# Patient Record
Sex: MALE | Race: WHITE | Employment: PART TIME | URBAN - METROPOLITAN AREA
[De-identification: names, ages, dates, MRNs, and addresses within clinical notes are randomized per-mention and may not be internally consistent; named-entity substitution may affect disease eponyms.]

---

## 2017-05-12 ENCOUNTER — HOSPITAL ENCOUNTER (OUTPATIENT)
Age: 37
Discharge: HOME | End: 2017-05-12
Payer: COMMERCIAL

## 2017-05-12 LAB
ALBUMIN SERPL-MCNC: 4.6 G/DL (ref 3.5–5.2)
ALBUMIN/GLOBULIN RATIO: 1.6 (ref 1–2.5)
ALP BLD-CCNC: 52 U/L (ref 40–129)
ALT SERPL-CCNC: 32 U/L (ref 5–41)
ANION GAP SERPL CALCULATED.3IONS-SCNC: 14 MMOL/L (ref 9–17)
AST SERPL-CCNC: 16 U/L
BILIRUB SERPL-MCNC: 0.65 MG/DL (ref 0.3–1.2)
BUN BLDV-MCNC: 16 MG/DL (ref 6–20)
BUN/CREAT BLD: ABNORMAL (ref 9–20)
CALCIUM SERPL-MCNC: 9.3 MG/DL (ref 8.6–10.4)
CHLORIDE BLD-SCNC: 98 MMOL/L (ref 98–107)
CHOLESTEROL, FASTING: 231 MG/DL
CHOLESTEROL/HDL RATIO: 5
CO2: 25 MMOL/L (ref 20–31)
CREAT SERPL-MCNC: 1.07 MG/DL (ref 0.7–1.2)
GFR AFRICAN AMERICAN: >60 ML/MIN
GFR NON-AFRICAN AMERICAN: >60 ML/MIN
GFR SERPL CREATININE-BSD FRML MDRD: ABNORMAL ML/MIN/{1.73_M2}
GFR SERPL CREATININE-BSD FRML MDRD: ABNORMAL ML/MIN/{1.73_M2}
GLUCOSE FASTING: 100 MG/DL (ref 70–99)
HCT VFR BLD CALC: 47.1 % (ref 41–53)
HDLC SERPL-MCNC: 46 MG/DL
HEMOGLOBIN: 16.1 G/DL (ref 13.5–17.5)
LDL CHOLESTEROL: 159 MG/DL (ref 0–130)
MCH RBC QN AUTO: 29.6 PG (ref 26–34)
MCHC RBC AUTO-ENTMCNC: 34.3 G/DL (ref 31–37)
MCV RBC AUTO: 86.5 FL (ref 80–100)
PDW BLD-RTO: 12.9 % (ref 12.5–15.4)
PLATELET # BLD: 231 K/UL (ref 140–450)
PMV BLD AUTO: 7.4 FL (ref 6–12)
POTASSIUM SERPL-SCNC: 3.8 MMOL/L (ref 3.7–5.3)
RBC # BLD: 5.45 M/UL (ref 4.5–5.9)
SODIUM BLD-SCNC: 137 MMOL/L (ref 135–144)
THYROXINE, FREE: 1.41 NG/DL (ref 0.93–1.7)
TOTAL PROTEIN: 7.5 G/DL (ref 6.4–8.3)
TRIGLYCERIDE, FASTING: 132 MG/DL
TSH SERPL DL<=0.05 MIU/L-ACNC: 3.06 MIU/L (ref 0.3–5)
VLDLC SERPL CALC-MCNC: ABNORMAL MG/DL (ref 1–30)
WBC # BLD: 5.8 K/UL (ref 3.5–11)

## 2017-05-12 PROCEDURE — 83036 HEMOGLOBIN GLYCOSYLATED A1C: CPT

## 2017-05-12 PROCEDURE — 36415 COLL VENOUS BLD VENIPUNCTURE: CPT

## 2017-05-12 PROCEDURE — 85027 COMPLETE CBC AUTOMATED: CPT

## 2017-05-12 PROCEDURE — 84439 ASSAY OF FREE THYROXINE: CPT

## 2017-05-12 PROCEDURE — 84443 ASSAY THYROID STIM HORMONE: CPT

## 2017-05-12 PROCEDURE — 80053 COMPREHEN METABOLIC PANEL: CPT

## 2017-05-12 PROCEDURE — 80061 LIPID PANEL: CPT

## 2017-05-13 LAB
ESTIMATED AVERAGE GLUCOSE: 97 MG/DL
HBA1C MFR BLD: 5 % (ref 4–6)

## 2017-12-29 ENCOUNTER — HOSPITAL ENCOUNTER (INPATIENT)
Age: 37
LOS: 2 days | Discharge: HOME | DRG: 885 | End: 2017-12-31
Attending: EMERGENCY MEDICINE | Admitting: PSYCHIATRY & NEUROLOGY
Payer: COMMERCIAL

## 2017-12-29 DIAGNOSIS — F32.A DEPRESSION WITH SUICIDAL IDEATION: Primary | ICD-10-CM

## 2017-12-29 DIAGNOSIS — R45.851 DEPRESSION WITH SUICIDAL IDEATION: Primary | ICD-10-CM

## 2017-12-29 PROBLEM — F31.9 BIPOLAR 1 DISORDER (HCC): Status: ACTIVE | Noted: 2017-12-29

## 2017-12-29 LAB
AMPHETAMINE SCREEN URINE: NEGATIVE
BARBITURATE SCREEN URINE: NEGATIVE
BENZODIAZEPINE SCREEN, URINE: NEGATIVE
BUPRENORPHINE URINE: NORMAL
CANNABINOID SCREEN URINE: NEGATIVE
COCAINE METABOLITE, URINE: NEGATIVE
MDMA URINE: NORMAL
METHADONE SCREEN, URINE: NEGATIVE
METHAMPHETAMINE, URINE: NORMAL
OPIATES, URINE: NEGATIVE
OXYCODONE SCREEN URINE: NEGATIVE
PHENCYCLIDINE, URINE: NEGATIVE
PROPOXYPHENE, URINE: NORMAL
TEST INFORMATION: NORMAL
TRICYCLIC ANTIDEPRESSANTS, UR: NORMAL

## 2017-12-29 PROCEDURE — 80307 DRUG TEST PRSMV CHEM ANLYZR: CPT

## 2017-12-29 PROCEDURE — 6370000000 HC RX 637 (ALT 250 FOR IP): Performed by: PSYCHIATRY & NEUROLOGY

## 2017-12-29 PROCEDURE — 1240000000 HC EMOTIONAL WELLNESS R&B

## 2017-12-29 PROCEDURE — 99285 EMERGENCY DEPT VISIT HI MDM: CPT

## 2017-12-29 RX ORDER — BUPROPION HYDROCHLORIDE 300 MG/1
300 TABLET ORAL EVERY MORNING
COMMUNITY

## 2017-12-29 RX ORDER — MONTELUKAST SODIUM 10 MG/1
10 TABLET ORAL NIGHTLY
COMMUNITY

## 2017-12-29 RX ORDER — LORAZEPAM 1 MG/1
1 TABLET ORAL EVERY 6 HOURS PRN
Status: DISCONTINUED | OUTPATIENT
Start: 2017-12-29 | End: 2017-12-31 | Stop reason: HOSPADM

## 2017-12-29 RX ORDER — HALOPERIDOL 1 MG/1
1 TABLET ORAL 2 TIMES DAILY
Status: DISCONTINUED | OUTPATIENT
Start: 2017-12-29 | End: 2017-12-31 | Stop reason: HOSPADM

## 2017-12-29 RX ORDER — CLONAZEPAM 1 MG/1
1 TABLET ORAL 2 TIMES DAILY PRN
COMMUNITY
End: 2018-10-08

## 2017-12-29 RX ORDER — HALOPERIDOL 5 MG/ML
5 INJECTION INTRAMUSCULAR 3 TIMES DAILY PRN
Status: DISCONTINUED | OUTPATIENT
Start: 2017-12-29 | End: 2017-12-31 | Stop reason: HOSPADM

## 2017-12-29 RX ORDER — MAGNESIUM HYDROXIDE/ALUMINUM HYDROXICE/SIMETHICONE 120; 1200; 1200 MG/30ML; MG/30ML; MG/30ML
30 SUSPENSION ORAL 2 TIMES DAILY PRN
Status: DISCONTINUED | OUTPATIENT
Start: 2017-12-29 | End: 2017-12-31 | Stop reason: HOSPADM

## 2017-12-29 RX ORDER — BUPROPION HYDROCHLORIDE 150 MG/1
300 TABLET, EXTENDED RELEASE ORAL DAILY
Status: DISCONTINUED | OUTPATIENT
Start: 2017-12-29 | End: 2017-12-31 | Stop reason: HOSPADM

## 2017-12-29 RX ORDER — CLONAZEPAM 1 MG/1
1 TABLET, ORALLY DISINTEGRATING ORAL 3 TIMES DAILY PRN
Status: DISCONTINUED | OUTPATIENT
Start: 2017-12-29 | End: 2017-12-29

## 2017-12-29 RX ORDER — BENZTROPINE MESYLATE 1 MG/ML
2 INJECTION INTRAMUSCULAR; INTRAVENOUS DAILY PRN
Status: DISCONTINUED | OUTPATIENT
Start: 2017-12-29 | End: 2017-12-31 | Stop reason: HOSPADM

## 2017-12-29 RX ORDER — TRAZODONE HYDROCHLORIDE 50 MG/1
50 TABLET ORAL NIGHTLY PRN
Status: DISCONTINUED | OUTPATIENT
Start: 2017-12-29 | End: 2017-12-31 | Stop reason: HOSPADM

## 2017-12-29 RX ORDER — BUPROPION HYDROCHLORIDE 100 MG/1
100 TABLET ORAL 2 TIMES DAILY
Status: ON HOLD | COMMUNITY
End: 2017-12-29 | Stop reason: ALTCHOICE

## 2017-12-29 RX ORDER — HALOPERIDOL 1 MG/1
1 TABLET ORAL 2 TIMES DAILY
Status: DISCONTINUED | OUTPATIENT
Start: 2017-12-29 | End: 2017-12-29

## 2017-12-29 RX ORDER — HALOPERIDOL 1 MG/1
1 TABLET ORAL 2 TIMES DAILY
COMMUNITY

## 2017-12-29 RX ORDER — ACETAMINOPHEN 325 MG/1
650 TABLET ORAL EVERY 6 HOURS PRN
Status: DISCONTINUED | OUTPATIENT
Start: 2017-12-29 | End: 2017-12-31 | Stop reason: HOSPADM

## 2017-12-29 RX ORDER — MONTELUKAST SODIUM 10 MG/1
10 TABLET ORAL NIGHTLY
Status: DISCONTINUED | OUTPATIENT
Start: 2017-12-29 | End: 2017-12-31 | Stop reason: HOSPADM

## 2017-12-29 RX ORDER — CLONAZEPAM 1 MG/1
1 TABLET ORAL 2 TIMES DAILY
Status: DISCONTINUED | OUTPATIENT
Start: 2017-12-29 | End: 2017-12-31 | Stop reason: HOSPADM

## 2017-12-29 RX ADMIN — CLONAZEPAM 1 MG: 1 TABLET ORAL at 20:20

## 2017-12-29 RX ADMIN — HALOPERIDOL 1 MG: 1 TABLET ORAL at 19:17

## 2017-12-29 ASSESSMENT — ENCOUNTER SYMPTOMS
SINUS PRESSURE: 0
TROUBLE SWALLOWING: 0
RHINORRHEA: 0
BLOOD IN STOOL: 0
CONSTIPATION: 0
ABDOMINAL PAIN: 0
COLOR CHANGE: 0
SORE THROAT: 0
DIARRHEA: 0
COUGH: 0
WHEEZING: 0
SHORTNESS OF BREATH: 0
BACK PAIN: 0
NAUSEA: 0
EYE PAIN: 0
FACIAL SWELLING: 0
EYE REDNESS: 0
VOMITING: 0
CHEST TIGHTNESS: 0
EYE DISCHARGE: 0

## 2017-12-29 ASSESSMENT — SLEEP AND FATIGUE QUESTIONNAIRES
DO YOU HAVE DIFFICULTY SLEEPING: NO
DO YOU USE A SLEEP AID: NO
AVERAGE NUMBER OF SLEEP HOURS: 8
DO YOU USE A SLEEP AID: COMMENT
DO YOU HAVE DIFFICULTY SLEEPING: NO

## 2017-12-29 ASSESSMENT — PATIENT HEALTH QUESTIONNAIRE - PHQ9: SUM OF ALL RESPONSES TO PHQ QUESTIONS 1-9: 5

## 2017-12-29 ASSESSMENT — LIFESTYLE VARIABLES
HISTORY_ALCOHOL_USE: NO
HISTORY_ALCOHOL_USE: NO

## 2017-12-29 NOTE — ED PROVIDER NOTES
[address]  eMERGENCY dEPARTMENT eNCOUnter      Pt Name: Wyman Cunas. Lavada Curling  MRN: [id number]  Birthdate 1980  Date of evaluation: 12/29/17      CHIEF COMPLAINT       Chief Complaint   Patient presents with   [address]         HISTORY OF PRESENT ILLNESS    Jean Marie Faulkner A. Lavada Curling is a 30 y.o. male who presents complaining of Psychiatric evaluation. Patient comes in today complaining of feeling like he's got no direction in life and is depressed. Patient states he does have a long history of depression and has been under treatment by a private psychiatrist.  Patient did go see his psychiatrist earlier this week but the symptoms weren't as bad so he didn't bring him up to him. Patient states that his stress level has been getting worse and he constantly worries about money and that the last few days he's been considering killing himself. Patient states he has a plan to lock himself in the car and turned on in the garage and suffocate with carbon monoxide. Patient states he has no drug or alcohol intake. Patient is been taking his medications like he supposed to. Patient states is really no new stressors just chronic everyday stressors. Patient denies somatic complaints. Patient denies hallucinations. REVIEW OF SYSTEMS       Review of Systems   Constitutional: Negative for activity change, appetite change, chills, diaphoresis and fever. HENT: Negative for congestion, ear pain, facial swelling, nosebleeds, rhinorrhea, sinus pressure, sore throat and trouble swallowing. Eyes: Negative for pain, discharge and redness. Respiratory: Negative for cough, chest tightness, shortness of breath and wheezing. Cardiovascular: Negative for chest pain, palpitations and leg swelling. Gastrointestinal: Negative for abdominal pain, blood in stool, constipation, diarrhea, nausea and vomiting.    Genitourinary: Negative for difficulty urinating, dysuria, flank pain, frequency, genital sores and hematuria. Musculoskeletal: Negative for arthralgias, back pain, gait problem, joint swelling, myalgias and neck pain. Skin: Negative for color change, pallor, rash and wound. Neurological: Negative for dizziness, tremors, seizures, syncope, speech difficulty, weakness, numbness and headaches. Psychiatric/Behavioral: Positive for dysphoric mood and suicidal ideas. Negative for confusion, decreased concentration, hallucinations, self-injury and sleep disturbance. PAST MEDICAL HISTORY     Past Medical History:   Diagnosis Date    ADHD     Anxiety     Depression     Tourette's        SURGICAL HISTORY     History reviewed. No pertinent surgical history. CURRENT MEDICATIONS       Previous Medications    BUPROPION (WELLBUTRIN) 100 MG TABLET    Take 100 mg by mouth 2 times daily    CLONAZEPAM (KLONOPIN) 1 MG TABLET    Take 1 mg by mouth 2 times daily as needed. HALOPERIDOL (HALDOL) 1 MG TABLET    Take 1 mg by mouth 2 times daily    MONTELUKAST (SINGULAIR) 10 MG TABLET    Take 10 mg by mouth nightly       ALLERGIES     has No Known Allergies. SOCIAL HISTORY      reports that he has never smoked. He has never used smokeless tobacco. He reports that he does not drink alcohol or use drugs. PHYSICAL EXAM     INITIAL VITALS: /83   Pulse 62   Temp 98.3 °F (36.8 °C)   Resp 14   Ht 5' 10\" (1.778 m)   Wt 167 lb (75.8 kg)   SpO2 96%   BMI 23.96 kg/m²      Physical Exam   Constitutional: He is oriented to person, place, and time. He appears well-developed and well-nourished. No distress. HENT:   Head: Normocephalic and atraumatic. Eyes: Conjunctivae and EOM are normal. Pupils are equal, round, and reactive to light. Right eye exhibits no discharge. Left eye exhibits no discharge. No scleral icterus. Cardiovascular: Normal rate, regular rhythm, normal heart sounds and intact distal pulses. Exam reveals no gallop and no friction rub. No murmur heard.   Pulmonary/Chest: Effort normal and breath sounds normal. No respiratory distress. He has no wheezes. He has no rales. Neurological: He is alert and oriented to person, place, and time. Skin: He is not diaphoretic. Psychiatric: His speech is normal. He is withdrawn. He exhibits a depressed mood. He expresses suicidal ideation. He expresses no homicidal ideation. He expresses suicidal plans. DIAGNOSTIC RESULTS     RADIOLOGY:All plain film, CT, MRI, and formal ultrasound images (except ED bedside ultrasound) are read by the radiologist and the images and interpretations are directly viewed by the emergency physician. LABS: All lab results were reviewed by myself, and all abnormals are listed below. Labs Reviewed - No data to display      MEDICAL DECISION MAKING:     Patient is medically cleared for psychiatric evaluation. EMERGENCY DEPARTMENT COURSE:   Vitals:    Vitals:    12/29/17 1016   BP: 137/83   Pulse: 62   Resp: 14   Temp: 98.3 °F (36.8 °C)   SpO2: 96%   Weight: 167 lb (75.8 kg)   Height: 5' 10\" (1.778 m)       The patient was given the following medications while in the emergency department:  No orders of the defined types were placed in this encounter. -------------------------  11:49 AM  Patient has been evaluated and will be admitted to the [facility] for further psychiatric evaluation and treatment. CONSULTS:  None    PROCEDURES:  none    FINAL IMPRESSION      1.  Depression with suicidal ideation          DISPOSITION/PLAN   DISPOSITION Admitted 12/29/2017 11:48:12 AM      PATIENT REFERRED TO:  Ruba Moura MD  [address] [place] [address]             DISCHARGE MEDICATIONS:  New Prescriptions    No medications on file       (Please note that portions of this note were completed with a voice recognition program.  Efforts were made to edit the dictations but occasionally words are mis-transcribed.)    Valentine Chaudhry MD  Attending Emergency Physician                        [facility]

## 2017-12-29 NOTE — ED NOTES
Provisional Diagnosis:   Bi-polar disorder. Psychosocial and Contextual Factors:   Patient reports recent obsessive thoughts about his financial situation. Patient reports he hates his job    C-SSRS Summary:      Patient: X  Family:   Agency:       Present Suicidal Behavior:  Patient reports suicidal ideation with a plan and intent. Patient reports he texted his Taoism friend last night and asked if what would happen if he completed suicide. Verbal: X    Attempt:    Past Suicidal Behavior: Patient reports past suicidal ideation but denies past attempt. Verbal:    Attempt:      Self-Injurious/Self-Mutilation:Patient reports in the past he has cut to relieve pain. Trauma Identified:  Patient denies. Protective Factors:    Patient has a full time job, income, and housing. Patient reports support from his girlfriend. Patient reports his girlfriend and Taoism beliefs are protective factors. Patient states he exercises 3 days a week. Risk Factors:    Patient reports suicidal thoughts with a plan. Patient reports financial stress. Clinical Summary:    Rika Rose. Jarod Baeza is a 30 y.o. male who presents to the ED for suicidal ideation with a plan to kill himself by carbon monoxide poisoning in his car and garage. Patient reports he feels completely stuck in life  Patient states he has been comparing himself to his other college peers who are making a lot of money while he \"works for 12 hours an hour\". Patient works full time as a pharmacy tech. Patient states he absolutely hates his job and his coworkers. Patient states he has 2 college degrees and does not use them which makes him frustrated. Patient reports daily feelings of hopelessness and helplessness. Patient reports he had a particular rough day at work yesterday. Patient reports poor sleep.      Patient reports obsessive thoughts about his financial situation and states he often calculates his finances and not being able to afford things in the future like FDC and nursing home placement decades from now. Patient states he wrote a text last night to a [employer] friend about what would happen if he followed through with suicide. Patient states his friend told him to go to [employer] or a hospital.  Patient reports he is compliant with his outpatient medication. Patient is medication compliant with medications prescribed by Dr. Yuri Titus. Patient denies homicidal ideation. Patient denies auditory and visual hallucinations. Level of Care Disposition:    Writer consulted with Dr. Sandra Borrego who recommends inpatient psychiatric admission. Patient is in agreement.        Insurance Precertification Authorization:

## 2017-12-29 NOTE — BH NOTE
[facility] PSYCHOEDUCATION GROUP NOTE     Date:  12/29/17       Start Time:  1600    End Time: 36        Number Participants in Group:12     Name of group: Communication Skills     Discipline Responsible:    OT   AT    x Nsg.   RT MHP Other         Participation Level:                   None   Minimal   x Active Listener x Interactive     Monopolizing             Participation Quality:  x Appropriate   Inappropriate   x       Attentive         Intrusive   x       Sharing         Resistant   x       Supportive         Lethargic         Affective:          x Congruent   Incongruent   Blunted   Flat     Constricted   Anxious   Elated   Angry     Labile   Depressed   Other             Cognitive:  x Alert x Oriented PPTP      Concentration   G x F   P   Attention Span   G x F   P   Short-Term Memory   G x F   P   Long-Term Memory   G x F   P   ProblemSolving/  Decision Making   G x F   P   Ability to Process  Information   G x F   P        Contributing Factors              Delusional              Hallucinating              Flight of Ideas              Other: poor concentration         Modes of Intervention:  x Education x Support x Exploration     Clarifying   Problem Solving   Confrontation   x Socialization   Limit Setting   Reality Testing   x Activity   Movement   Media     Other:                  Response to Learning:  x Able to verbalize current knowledge/experience   x Able to verbalize/acknowledge new learning   x Able to retain information   x Capable of insight   x Able to change behavior   x Progressing to goal     Other:          Comments:

## 2017-12-30 LAB
ABSOLUTE EOS #: 0.3 K/UL (ref 0–0.4)
ABSOLUTE IMMATURE GRANULOCYTE: ABNORMAL K/UL (ref 0–0.3)
ABSOLUTE LYMPH #: 2.4 K/UL (ref 1–4.8)
ABSOLUTE MONO #: 0.6 K/UL (ref 0.1–1.3)
ALBUMIN SERPL-MCNC: 4.5 G/DL (ref 3.5–5.2)
ALBUMIN/GLOBULIN RATIO: ABNORMAL (ref 1–2.5)
ALP BLD-CCNC: 51 U/L (ref 40–129)
ALT SERPL-CCNC: 28 U/L (ref 5–41)
ANION GAP SERPL CALCULATED.3IONS-SCNC: 13 MMOL/L (ref 9–17)
AST SERPL-CCNC: 17 U/L
BASOPHILS # BLD: 1 % (ref 0–2)
BASOPHILS ABSOLUTE: 0 K/UL (ref 0–0.2)
BILIRUB SERPL-MCNC: 0.77 MG/DL (ref 0.3–1.2)
BUN BLDV-MCNC: 18 MG/DL (ref 6–20)
BUN/CREAT BLD: ABNORMAL (ref 9–20)
CALCIUM SERPL-MCNC: 9.5 MG/DL (ref 8.6–10.4)
CHLORIDE BLD-SCNC: 100 MMOL/L (ref 98–107)
CO2: 27 MMOL/L (ref 20–31)
CREAT SERPL-MCNC: 1.07 MG/DL (ref 0.7–1.2)
DIFFERENTIAL TYPE: ABNORMAL
EOSINOPHILS RELATIVE PERCENT: 5 % (ref 0–4)
GFR AFRICAN AMERICAN: >60 ML/MIN
GFR NON-AFRICAN AMERICAN: >60 ML/MIN
GFR SERPL CREATININE-BSD FRML MDRD: ABNORMAL ML/MIN/{1.73_M2}
GFR SERPL CREATININE-BSD FRML MDRD: ABNORMAL ML/MIN/{1.73_M2}
GLUCOSE BLD-MCNC: 101 MG/DL (ref 70–99)
HCT VFR BLD CALC: 49.9 % (ref 41–53)
HEMOGLOBIN: 16.9 G/DL (ref 13.5–17.5)
IMMATURE GRANULOCYTES: ABNORMAL %
LYMPHOCYTES # BLD: 36 % (ref 24–44)
MCH RBC QN AUTO: 30 PG (ref 26–34)
MCHC RBC AUTO-ENTMCNC: 33.8 G/DL (ref 31–37)
MCV RBC AUTO: 88.7 FL (ref 80–100)
MONOCYTES # BLD: 9 % (ref 1–7)
PDW BLD-RTO: 12.8 % (ref 11.5–14.9)
PLATELET # BLD: 231 K/UL (ref 150–450)
PLATELET ESTIMATE: ABNORMAL
PMV BLD AUTO: 7.2 FL (ref 6–12)
POTASSIUM SERPL-SCNC: 4.1 MMOL/L (ref 3.7–5.3)
RBC # BLD: 5.62 M/UL (ref 4.5–5.9)
RBC # BLD: ABNORMAL 10*6/UL
SEG NEUTROPHILS: 49 % (ref 36–66)
SEGMENTED NEUTROPHILS ABSOLUTE COUNT: 3.2 K/UL (ref 1.3–9.1)
SODIUM BLD-SCNC: 140 MMOL/L (ref 135–144)
TOTAL PROTEIN: 7.1 G/DL (ref 6.4–8.3)
WBC # BLD: 6.5 K/UL (ref 3.5–11)
WBC # BLD: ABNORMAL 10*3/UL

## 2017-12-30 PROCEDURE — 6370000000 HC RX 637 (ALT 250 FOR IP): Performed by: PSYCHIATRY & NEUROLOGY

## 2017-12-30 PROCEDURE — 36415 COLL VENOUS BLD VENIPUNCTURE: CPT

## 2017-12-30 PROCEDURE — 1240000000 HC EMOTIONAL WELLNESS R&B

## 2017-12-30 PROCEDURE — 80053 COMPREHEN METABOLIC PANEL: CPT

## 2017-12-30 PROCEDURE — 85025 COMPLETE CBC W/AUTO DIFF WBC: CPT

## 2017-12-30 RX ADMIN — CLONAZEPAM 1 MG: 1 TABLET ORAL at 17:38

## 2017-12-30 RX ADMIN — HALOPERIDOL 1 MG: 1 TABLET ORAL at 09:41

## 2017-12-30 RX ADMIN — TRAZODONE HYDROCHLORIDE 50 MG: 50 TABLET ORAL at 21:27

## 2017-12-30 RX ADMIN — HALOPERIDOL 1 MG: 1 TABLET ORAL at 17:38

## 2017-12-30 RX ADMIN — MONTELUKAST SODIUM 10 MG: 10 TABLET, FILM COATED ORAL at 21:27

## 2017-12-30 RX ADMIN — CLONAZEPAM 1 MG: 1 TABLET ORAL at 09:40

## 2017-12-30 RX ADMIN — BUPROPION HYDROCHLORIDE 300 MG: 150 TABLET, FILM COATED, EXTENDED RELEASE ORAL at 09:41

## 2017-12-31 VITALS
TEMPERATURE: 97.9 F | BODY MASS INDEX: 23.02 KG/M2 | WEIGHT: 160.8 LBS | RESPIRATION RATE: 14 BRPM | OXYGEN SATURATION: 96 % | DIASTOLIC BLOOD PRESSURE: 72 MMHG | HEART RATE: 63 BPM | HEIGHT: 70 IN | SYSTOLIC BLOOD PRESSURE: 118 MMHG

## 2017-12-31 PROCEDURE — 6370000000 HC RX 637 (ALT 250 FOR IP): Performed by: PSYCHIATRY & NEUROLOGY

## 2017-12-31 PROCEDURE — 5130000000 HC BRIDGE APPOINTMENT

## 2017-12-31 RX ADMIN — HALOPERIDOL 1 MG: 1 TABLET ORAL at 08:47

## 2017-12-31 RX ADMIN — BUPROPION HYDROCHLORIDE 300 MG: 150 TABLET, FILM COATED, EXTENDED RELEASE ORAL at 08:47

## 2017-12-31 RX ADMIN — CLONAZEPAM 1 MG: 1 TABLET ORAL at 08:47

## 2017-12-31 ASSESSMENT — LIFESTYLE VARIABLES: HISTORY_ALCOHOL_USE: NO

## 2017-12-31 NOTE — BH NOTE
Psychoeducation Group Note    Date: 12/31/17  Start Time: 845AM  End Time: 920AM    Number Participants in Group:  12    Goal:  Patient will demonstrate increased interpersonal interaction   Topic: COMMUNITY MEETING/GOALS GROUP    Discipline Responsible:   OT  AT  [facility]. X RT  Other       Participation Level:     None  Minimal   X Active Listener X Interactive    Monopolizing         Participation Quality:   Appropriate  Inappropriate   X       Attentive        Intrusive   X       Sharing        Resistant   X       Supportive        Lethargic       Affective:   X Congruent  Incongruent  Blunted  Flat    Constricted  Anxious  Elated  Angry    Labile  Depressed  Other X BRIGHTENS       Cognitive:  X Alert X Oriented PPTP     Concentration X G  F  P   Attention Span X G  F  P   Short-Term Memory  G  F  P   Long-Term Memory  G  F  P   ProblemSolving/  Decision Making  G X F  P   Ability to Process  Information X G  F  P      Contributing Factors             Delusional             Hallucinating             Flight of Ideas             Other:       Modes of Intervention:  X Education X Support X Exploration   X Clarifying X Problem Solving  Confrontation    Socialization  Limit Setting  Reality Testing   X Activity  Movement  Media    Other:            Response to Learning:  X Able to verbalize current knowledge/experience   X Able to verbalize/acknowledge new learning   X Able to retain information    Capable of insight    Able to change behavior   X Progressing to goal    Other:        Comments:

## 2017-12-31 NOTE — CARE COORDINATION
Writer meets with PT regarding discharge planning and to check on suicidal ideation as stated during bio-psychosocial assessment at admission. PT is future focused and is planning to go home and spend time with parents and girlfriend. PT is looking forward to working out and preparing for compatition in May. PT states has set short and long term goals and making steps while here at the hospital.  PT denies current thoughts or plans of suicide, no feelings of hopelessness, and plans to attend follow-up appointment and stay on medications.

## 2017-12-31 NOTE — H&P
WNL.    EARS:  No discharge, no marked hearing loss. NOSE:  No rhinorrhea, epistaxis or septal deformity. THROAT:  Not congested. No ulceration bleeding or discharge. NECK:  No stiffness, trachea central.  No palpable masses or L.N.      CHEST:  Symmetrical and equal on expansion. HEART:  Regular rate and rhythm. S1 > S2, No audible murmurs or gallops. LUNGS:  Equal on expansion, normal breath sounds. No adventitious sounds. ABDOMEN:  Soft on palpation. No localized tenderness. No guarding or rigidity. No palpable organomegaly. LYMPHATICS:  No palpable Lymphadenopathy. LOCOMOTOR, BACK AND SPINE:  No tenderness or deformities. EXTREMITIES:  Symmetrical, no pedal edema. Aryans sign negative. No discoloration or ulcerations. NEUROLOGIC:  The patient is conscious, alert, oriented, Gait and balance WNL. No apparent focal sensory deficits. No motor deficits, muscle strength equal Anthony. No facial droop, tongue protrudes centrally, no slurring of the speech.             PROVISIONAL DIAGNOSES:      Principal Problem:    B[facility] 1 [facility] ([facility]      ALONZO COLLADO PA-C on 12/31/2017 at 2:06 PM

## 2017-12-31 NOTE — BH NOTE
PSYCHOEDUCATION GROUP NOTE    Date: 12/30/2017  Start Time: 1600  End Time: 1645    Number Participants in Group:  12    Goal:  Patient will demonstrate increased interpersonal interaction   Topic: goals    Discipline Responsible:   OT  AT   X Nsg.  RT MHP Other       Participation Level:     None  Minimal    Active Listener X Interactive    Monopolizing         Participation Quality:  X Appropriate  Inappropriate          Attentive        Intrusive          Sharing        Resistant          Supportive        Lethargic       Affective:   X Congruent  Incongruent  Blunted  Flat    Constricted  Anxious  Elated  Angry    Labile  Depressed  Other         Cognitive:  X Alert  Oriented PPTP     Concentration X G  F  P   Attention Span  G  F  P   Short-Term Memory  G  F  P   Long-Term Memory  G  F  P   ProblemSolving/  Decision Making  G  F  P   Ability to Process  Information  G  F  P      Contributing Factors             Delusional             Hallucinating             Flight of Ideas             Other:       Modes of Intervention:  X Education X Support  Exploration    Clarifying  Problem Solving  Confrontation    Socialization  Limit Setting  Reality Testing    Activity  Movement  Media    Other:            Response to Learning:   Able to verbalize current knowledge/experience    Able to verbalize/acknowledge new learning    Able to retain information    Capable of insight    Able to change behavior   X Progressing to goal    Other:        Comments:

## 2017-12-31 NOTE — PLAN OF CARE
Problem: Altered Mood, Depressive Behavior  Goal: LTG-Able to verbalize acceptance of life and situations over which he or she has no control  Outcome: Ongoing  Patient voiced being anxious. Was out in dayroom. Took all meds. Safety checks maintained Q15 minutes and regular times. No Si. Goal: STG-Able to verbalize suicidal ideations  Outcome: Ongoing  Pt denies thoughts of self harm and is agreeable to seeking out should thoughts of self harm arise. Safe environment maintained. Q15 minute checks for safety cont per unit policy. Will cont to monitor for safety and provides support and reassurance as needed.
Problem: Altered Mood, Depressive Behavior  Goal: LTG-Able to verbalize and/or display a decrease in depressive symptoms  Outcome: Ongoing  Pt able to verbalize a decrease in depressive symptoms at this time. Continue to monitor.
Problem: Altered Mood, Depressive Behavior  Goal: LTG-Absence of self-harm  Outcome: Ongoing  Patient is cooperative and pleasant and receptive to one on one interaction. He attends groups. He denies any thought to harm himself or others.  He denies any auditory or visual hallucinations
Problem: Altered Mood, Depressive Behavior  Goal: STG-Knowledge of positive coping patterns  Outcome: Ongoing  PSYCHOEDUCATIONAL GROUP NOTE       Date:  12/29/17                Start Time:  1:30pm                       End Time: 1:45pm      Number Participants in Group: 10/20      Goals: To participate in psychoeducational group and discuss discharge planning. RT X SW  Nsg  LPN   BHTII  Other       Participation Level:     None  Minimal   x Active Listener x Interactive    Monopolizing         Participation Quality:  x Appropriate  Inappropriate   x  Attentive   Intrusive   x  Sharing   Resistant   x  Supportive    Lethargic       Affective:   x Congruent  Incongruent  Blunted  Flat    Constricted  Anxious  Elated  Angry    Labile  Depressed  Other         Cognitive:  X Alert  Oriented PPTS     Concentration G x F  P    Attention Span G x F  P    Short-Term Memory G x F  P    Long-Term Memory G x F  P    ProblemSolving/  Decision Making G x F  P    Ability to Process  Information G x F  P       Contributing Factors             Delusional             Hallucinating             Flight of Ideas             Other: poor concentration       Modes of Intervention:  X Education X Support X Exploration   X Clarifying X Problem Solving  Confrontation    Socialization  Limit Setting  Reality Testing    Activity  Movement  Media    Other:          Response to Learning:  x Able to verbalize current knowledge/experience   x Able to verbalize/acknowledge new learning   x Able to retain information   x Capable of insight   x Able to change behavior   x Progressing to goal    Other:        Comments: Pt attended psychoeducation group and was interactive.
Problem: Altered Mood, Depressive Behavior  Goal: STG-Knowledge of positive coping patterns  Outcome: Ongoing  Psychoeducation Group Note    Date: 12/30/2017  Start Time: 0845  End Time: 0910    Number Participants in Group:  10    Goal:  Patient will demonstrate increased interpersonal interaction   Topic: Community meeting/ goals group    Discipline Responsible:   OT  AT  [facility]. x RT  Other       Participation Level:     None  Minimal   x Active Listener x Interactive    Monopolizing         Participation Quality:  x Appropriate  Inappropriate   x       Attentive        Intrusive   x       Sharing        Resistant          Supportive        Lethargic       Affective:   x Congruent  Incongruent  Blunted  Flat    Constricted  Anxious  Elated  Angry    Labile  Depressed  Other         Cognitive:  x Alert x Oriented PPTP     Concentration x G  F  P   Attention Span x G  F  P   Short-Term Memory x G  F  P   Long-Term Memory x G  F  P   ProblemSolving/  Decision Making x G  F  P   Ability to Process  Information x G  F  P      Contributing Factors             Delusional             Hallucinating             Flight of Ideas             Other:       Modes of Intervention:  x Education x Support x Exploration   x Clarifying x Problem Solving  Confrontation   x Socialization  Limit Setting x Reality Testing   x Activity  Movement  Media    Other:            Response to Learning:  x Able to verbalize current knowledge/experience   x Able to verbalize/acknowledge new learning   x Able to retain information    Capable of insight    Able to change behavior   x Progressing to goal    Other:        Comments:
Progress Toward Treatment Goals: reviewed group plans and strategies for care    Method:group therapy, medication compliance, individualized assessments and care planning    Outcome: needs reinforcement    PATIENT GOALS: to be discussed with patient within 72 hours    PLAN/TREATMENT RECOMMENDATIONS:     continue group therapy , medications compliance, goal setting, individualized assessments and care, continue to monitor pt on unit      SHORT-TERM GOALS:   Time frame for Short-Term Goals: 5-7 days    LONG-TERM GOALS:  Time frame for Long-Term Goals: 6 months  Members Present in Team Meeting: See Signature Sheet    Jimmy Knock  Goal: LTG-Able to verbalize and/or display a decrease in depressive symptoms  Outcome: Ongoing    Goal: STG-Able to verbalize suicidal ideations  Outcome: Ongoing    Goal: STG-Able to verbalize support system  Outcome: Ongoing    Goal: LTG-Absence of self-harm  Outcome: Ongoing    Goal: STG-Knowledge of positive coping patterns  Outcome: Ongoing    Goal: Patient Specific Goal  Outcome: Ongoing    Goal: Participation in care planning  Outcome: Ongoing

## 2018-01-03 NOTE — CARE COORDINATION
Alen called Dr. Saul Trammell office for PT to schedule his follow up appointment and PT is scheduled for January 5 at 10:30 am. ALEN telephoned client and left a voicemail with this information.

## 2018-04-13 ENCOUNTER — HOSPITAL ENCOUNTER (OUTPATIENT)
Age: 38
Discharge: HOME | End: 2018-04-13
Payer: COMMERCIAL

## 2018-04-13 LAB
ABSOLUTE EOS #: 0.19 K/UL (ref 0–0.44)
ABSOLUTE IMMATURE GRANULOCYTE: <0.03 K/UL (ref 0–0.3)
ABSOLUTE LYMPH #: 2.84 K/UL (ref 1.1–3.7)
ABSOLUTE MONO #: 0.64 K/UL (ref 0.1–1.2)
ALBUMIN SERPL-MCNC: 4.5 G/DL (ref 3.5–5.2)
ALBUMIN/GLOBULIN RATIO: 1.7 (ref 1–2.5)
ALP BLD-CCNC: 57 U/L (ref 40–129)
ALT SERPL-CCNC: 35 U/L (ref 5–41)
ANION GAP SERPL CALCULATED.3IONS-SCNC: 10 MMOL/L (ref 9–17)
AST SERPL-CCNC: 21 U/L
BASOPHILS # BLD: 0 % (ref 0–2)
BASOPHILS ABSOLUTE: 0.03 K/UL (ref 0–0.2)
BILIRUB SERPL-MCNC: 0.56 MG/DL (ref 0.3–1.2)
BUN BLDV-MCNC: 21 MG/DL (ref 6–20)
BUN/CREAT BLD: ABNORMAL (ref 9–20)
CALCIUM SERPL-MCNC: 8.8 MG/DL (ref 8.6–10.4)
CHLORIDE BLD-SCNC: 101 MMOL/L (ref 98–107)
CHOLESTEROL, FASTING: 186 MG/DL
CHOLESTEROL/HDL RATIO: 3.7
CO2: 28 MMOL/L (ref 20–31)
CREAT SERPL-MCNC: 1.06 MG/DL (ref 0.7–1.2)
DIFFERENTIAL TYPE: NORMAL
EOSINOPHILS RELATIVE PERCENT: 3 % (ref 1–4)
GFR AFRICAN AMERICAN: >60 ML/MIN
GFR NON-AFRICAN AMERICAN: >60 ML/MIN
GFR SERPL CREATININE-BSD FRML MDRD: ABNORMAL ML/MIN/{1.73_M2}
GFR SERPL CREATININE-BSD FRML MDRD: ABNORMAL ML/MIN/{1.73_M2}
GLUCOSE BLD-MCNC: 83 MG/DL (ref 70–99)
HCT VFR BLD CALC: 47.7 % (ref 40.7–50.3)
HDLC SERPL-MCNC: 50 MG/DL
HEMOGLOBIN: 15.6 G/DL (ref 13–17)
IMMATURE GRANULOCYTES: 0 %
LDL CHOLESTEROL: 120 MG/DL (ref 0–130)
LYMPHOCYTES # BLD: 40 % (ref 24–43)
MCH RBC QN AUTO: 29.6 PG (ref 25.2–33.5)
MCHC RBC AUTO-ENTMCNC: 32.7 G/DL (ref 28.4–34.8)
MCV RBC AUTO: 90.5 FL (ref 82.6–102.9)
MONOCYTES # BLD: 9 % (ref 3–12)
NRBC AUTOMATED: 0 PER 100 WBC
PDW BLD-RTO: 12.1 % (ref 11.8–14.4)
PLATELET # BLD: 241 K/UL (ref 138–453)
PLATELET ESTIMATE: NORMAL
PMV BLD AUTO: 9.7 FL (ref 8.1–13.5)
POTASSIUM SERPL-SCNC: 3.7 MMOL/L (ref 3.7–5.3)
RBC # BLD: 5.27 M/UL (ref 4.21–5.77)
RBC # BLD: NORMAL 10*6/UL
SEG NEUTROPHILS: 48 % (ref 36–65)
SEGMENTED NEUTROPHILS ABSOLUTE COUNT: 3.45 K/UL (ref 1.5–8.1)
SODIUM BLD-SCNC: 139 MMOL/L (ref 135–144)
TOTAL PROTEIN: 7.1 G/DL (ref 6.4–8.3)
TRIGLYCERIDE, FASTING: 81 MG/DL
TSH SERPL DL<=0.05 MIU/L-ACNC: 3.16 MIU/L (ref 0.3–5)
VLDLC SERPL CALC-MCNC: NORMAL MG/DL (ref 1–30)
WBC # BLD: 7.2 K/UL (ref 3.5–11.3)
WBC # BLD: NORMAL 10*3/UL

## 2018-04-13 PROCEDURE — 85025 COMPLETE CBC W/AUTO DIFF WBC: CPT

## 2018-04-13 PROCEDURE — 83036 HEMOGLOBIN GLYCOSYLATED A1C: CPT

## 2018-04-13 PROCEDURE — 80053 COMPREHEN METABOLIC PANEL: CPT

## 2018-04-13 PROCEDURE — 36415 COLL VENOUS BLD VENIPUNCTURE: CPT

## 2018-04-13 PROCEDURE — 84443 ASSAY THYROID STIM HORMONE: CPT

## 2018-04-13 PROCEDURE — 80061 LIPID PANEL: CPT

## 2018-04-15 LAB
ESTIMATED AVERAGE GLUCOSE: 88 MG/DL
HBA1C MFR BLD: 4.7 % (ref 4–6)

## 2018-07-19 ENCOUNTER — HOSPITAL ENCOUNTER (OUTPATIENT)
Age: 38
Discharge: HOME | End: 2018-07-19
Payer: COMMERCIAL

## 2018-07-19 LAB
HEPATITIS C ANTIBODY: NONREACTIVE
HIV AG/AB: NONREACTIVE
T. PALLIDUM, IGG: NONREACTIVE

## 2018-07-19 PROCEDURE — 86696 HERPES SIMPLEX TYPE 2 TEST: CPT

## 2018-07-19 PROCEDURE — 86803 HEPATITIS C AB TEST: CPT

## 2018-07-19 PROCEDURE — 36415 COLL VENOUS BLD VENIPUNCTURE: CPT

## 2018-07-19 PROCEDURE — 87491 CHLMYD TRACH DNA AMP PROBE: CPT

## 2018-07-19 PROCEDURE — 86780 TREPONEMA PALLIDUM: CPT

## 2018-07-19 PROCEDURE — 87389 HIV-1 AG W/HIV-1&-2 AB AG IA: CPT

## 2018-07-19 PROCEDURE — 87591 N.GONORRHOEAE DNA AMP PROB: CPT

## 2018-07-19 PROCEDURE — 86695 HERPES SIMPLEX TYPE 1 TEST: CPT

## 2018-07-20 LAB
C. TRACHOMATIS DNA ,URINE: NEGATIVE
N. GONORRHOEAE DNA, URINE: NEGATIVE

## 2018-07-24 LAB — HERPES SIMPLEX VIRUS 1 IGG: 0.17

## 2018-07-27 LAB — HERPES SIMPLEX VIRUS 2 IGG: 0.03

## 2018-10-08 ENCOUNTER — HOSPITAL ENCOUNTER (EMERGENCY)
Age: 38
Discharge: HOME | End: 2018-10-08
Attending: EMERGENCY MEDICINE
Payer: COMMERCIAL

## 2018-10-08 VITALS
HEART RATE: 60 BPM | TEMPERATURE: 97.7 F | HEIGHT: 70 IN | WEIGHT: 168 LBS | SYSTOLIC BLOOD PRESSURE: 145 MMHG | OXYGEN SATURATION: 98 % | BODY MASS INDEX: 24.05 KG/M2 | RESPIRATION RATE: 14 BRPM | DIASTOLIC BLOOD PRESSURE: 95 MMHG

## 2018-10-08 DIAGNOSIS — R53.1 GENERALIZED WEAKNESS: Primary | ICD-10-CM

## 2018-10-08 DIAGNOSIS — R11.0 NAUSEA: ICD-10-CM

## 2018-10-08 LAB
ABSOLUTE EOS #: 0.3 K/UL (ref 0–0.4)
ABSOLUTE IMMATURE GRANULOCYTE: NORMAL K/UL (ref 0–0.3)
ABSOLUTE LYMPH #: 2.5 K/UL (ref 1–4.8)
ABSOLUTE MONO #: 0.7 K/UL (ref 0.1–1.2)
ANION GAP SERPL CALCULATED.3IONS-SCNC: 17 MMOL/L (ref 9–17)
BASOPHILS # BLD: 1 % (ref 0–2)
BASOPHILS ABSOLUTE: 0 K/UL (ref 0–0.2)
BUN BLDV-MCNC: 17 MG/DL (ref 6–20)
BUN/CREAT BLD: NORMAL (ref 9–20)
CALCIUM SERPL-MCNC: 9.7 MG/DL (ref 8.6–10.4)
CHLORIDE BLD-SCNC: 100 MMOL/L (ref 98–107)
CO2: 23 MMOL/L (ref 20–31)
CREAT SERPL-MCNC: 1.09 MG/DL (ref 0.7–1.2)
DIFFERENTIAL TYPE: NORMAL
EKG ATRIAL RATE: 57 BPM
EKG P AXIS: 60 DEGREES
EKG P-R INTERVAL: 168 MS
EKG Q-T INTERVAL: 424 MS
EKG QRS DURATION: 100 MS
EKG QTC CALCULATION (BAZETT): 412 MS
EKG R AXIS: 50 DEGREES
EKG T AXIS: 27 DEGREES
EKG VENTRICULAR RATE: 57 BPM
EOSINOPHILS RELATIVE PERCENT: 4 % (ref 1–4)
GFR AFRICAN AMERICAN: >60 ML/MIN
GFR NON-AFRICAN AMERICAN: >60 ML/MIN
GFR SERPL CREATININE-BSD FRML MDRD: NORMAL ML/MIN/{1.73_M2}
GFR SERPL CREATININE-BSD FRML MDRD: NORMAL ML/MIN/{1.73_M2}
GLUCOSE BLD-MCNC: 94 MG/DL (ref 70–99)
HCT VFR BLD CALC: 49.1 % (ref 41–53)
HEMOGLOBIN: 16.6 G/DL (ref 13.5–17.5)
IMMATURE GRANULOCYTES: NORMAL %
LYMPHOCYTES # BLD: 33 % (ref 24–44)
MCH RBC QN AUTO: 30 PG (ref 26–34)
MCHC RBC AUTO-ENTMCNC: 33.9 G/DL (ref 31–37)
MCV RBC AUTO: 88.4 FL (ref 80–100)
MONOCYTES # BLD: 10 % (ref 2–11)
NRBC AUTOMATED: NORMAL PER 100 WBC
PDW BLD-RTO: 12.8 % (ref 12.5–15.4)
PLATELET # BLD: 266 K/UL (ref 140–450)
PLATELET ESTIMATE: NORMAL
PMV BLD AUTO: 7.1 FL (ref 6–12)
POTASSIUM SERPL-SCNC: 3.9 MMOL/L (ref 3.7–5.3)
RBC # BLD: 5.55 M/UL (ref 4.5–5.9)
RBC # BLD: NORMAL 10*6/UL
SEG NEUTROPHILS: 52 % (ref 36–66)
SEGMENTED NEUTROPHILS ABSOLUTE COUNT: 3.9 K/UL (ref 1.8–7.7)
SODIUM BLD-SCNC: 140 MMOL/L (ref 135–144)
TROPONIN INTERP: NORMAL
TROPONIN T: <0.03 NG/ML
WBC # BLD: 7.5 K/UL (ref 3.5–11)
WBC # BLD: NORMAL 10*3/UL

## 2018-10-08 PROCEDURE — 99284 EMERGENCY DEPT VISIT MOD MDM: CPT

## 2018-10-08 PROCEDURE — 85025 COMPLETE CBC W/AUTO DIFF WBC: CPT

## 2018-10-08 PROCEDURE — 36415 COLL VENOUS BLD VENIPUNCTURE: CPT

## 2018-10-08 PROCEDURE — 84484 ASSAY OF TROPONIN QUANT: CPT

## 2018-10-08 PROCEDURE — 93005 ELECTROCARDIOGRAM TRACING: CPT

## 2018-10-08 PROCEDURE — 80048 BASIC METABOLIC PNL TOTAL CA: CPT

## 2018-10-08 RX ORDER — CLONAZEPAM 0.5 MG/1
0.5 TABLET ORAL 2 TIMES DAILY PRN
COMMUNITY

## 2018-10-08 ASSESSMENT — ENCOUNTER SYMPTOMS
EYE DISCHARGE: 0
ABDOMINAL PAIN: 0
VOMITING: 0
EYE REDNESS: 0
SORE THROAT: 0
COUGH: 0
BACK PAIN: 0
NAUSEA: 1
SHORTNESS OF BREATH: 0

## 2020-08-25 ENCOUNTER — HOSPITAL ENCOUNTER (OUTPATIENT)
Age: 40
Discharge: HOME | End: 2020-08-25
Payer: COMMERCIAL

## 2020-08-25 LAB — RUBV IGG SER QL: 348 IU/ML

## 2020-08-25 PROCEDURE — 86787 VARICELLA-ZOSTER ANTIBODY: CPT

## 2020-08-25 PROCEDURE — 86762 RUBELLA ANTIBODY: CPT

## 2020-08-25 PROCEDURE — 86735 MUMPS ANTIBODY: CPT

## 2020-08-25 PROCEDURE — 86765 RUBEOLA ANTIBODY: CPT

## 2020-08-25 PROCEDURE — 86481 TB AG RESPONSE T-CELL SUSP: CPT

## 2020-08-26 LAB
MEASLES IMMUNE (IGG): 2.85
MUV IGG SER QL: 2.82
VZV IGG SER QL IA: 2.35

## 2020-08-28 LAB — T-SPOT TB TEST: NORMAL

## 2020-10-11 ENCOUNTER — HOSPITAL ENCOUNTER (OUTPATIENT)
Age: 40
Discharge: HOME | End: 2020-10-11
Payer: COMMERCIAL

## 2020-10-11 LAB
ALBUMIN SERPL-MCNC: 4.1 G/DL (ref 3.5–5.2)
ALBUMIN/GLOBULIN RATIO: 2.3 (ref 1–2.5)
ALP BLD-CCNC: 42 U/L (ref 40–129)
ALT SERPL-CCNC: 33 U/L (ref 5–41)
ANION GAP SERPL CALCULATED.3IONS-SCNC: 9 MMOL/L (ref 9–17)
AST SERPL-CCNC: 20 U/L
BILIRUB SERPL-MCNC: 0.41 MG/DL (ref 0.3–1.2)
BUN BLDV-MCNC: 17 MG/DL (ref 6–20)
BUN/CREAT BLD: ABNORMAL (ref 9–20)
CALCIUM SERPL-MCNC: 8.8 MG/DL (ref 8.6–10.4)
CHLORIDE BLD-SCNC: 103 MMOL/L (ref 98–107)
CO2: 28 MMOL/L (ref 20–31)
CREAT SERPL-MCNC: 1.11 MG/DL (ref 0.7–1.2)
GFR AFRICAN AMERICAN: >60 ML/MIN
GFR NON-AFRICAN AMERICAN: >60 ML/MIN
GFR SERPL CREATININE-BSD FRML MDRD: ABNORMAL ML/MIN/{1.73_M2}
GFR SERPL CREATININE-BSD FRML MDRD: ABNORMAL ML/MIN/{1.73_M2}
GLUCOSE BLD-MCNC: 102 MG/DL (ref 70–99)
HCT VFR BLD CALC: 52.3 % (ref 40.7–50.3)
HEMOGLOBIN: 17.8 G/DL (ref 13–17)
MCH RBC QN AUTO: 29.9 PG (ref 25.2–33.5)
MCHC RBC AUTO-ENTMCNC: 34 G/DL (ref 28.4–34.8)
MCV RBC AUTO: 87.9 FL (ref 82.6–102.9)
NRBC AUTOMATED: 0 PER 100 WBC
PDW BLD-RTO: 12.5 % (ref 11.8–14.4)
PLATELET # BLD: 274 K/UL (ref 138–453)
PMV BLD AUTO: 8.6 FL (ref 8.1–13.5)
POTASSIUM SERPL-SCNC: 4.2 MMOL/L (ref 3.7–5.3)
RBC # BLD: 5.95 M/UL (ref 4.21–5.77)
RUBV IGG SER QL: 264.9 IU/ML
SARS-COV-2 ANTIBODY, TOTAL: NEGATIVE
SODIUM BLD-SCNC: 140 MMOL/L (ref 135–144)
TESTOSTERONE TOTAL: 202 NG/DL (ref 220–1000)
TOTAL PROTEIN: 5.9 G/DL (ref 6.4–8.3)
VITAMIN D 25-HYDROXY: 11.4 NG/ML (ref 30–100)
WBC # BLD: 6.9 K/UL (ref 3.5–11.3)

## 2020-10-11 PROCEDURE — 80053 COMPREHEN METABOLIC PANEL: CPT

## 2020-10-11 PROCEDURE — 86769 SARS-COV-2 COVID-19 ANTIBODY: CPT

## 2020-10-11 PROCEDURE — 86735 MUMPS ANTIBODY: CPT

## 2020-10-11 PROCEDURE — 86762 RUBELLA ANTIBODY: CPT

## 2020-10-11 PROCEDURE — 36415 COLL VENOUS BLD VENIPUNCTURE: CPT

## 2020-10-11 PROCEDURE — 86765 RUBEOLA ANTIBODY: CPT

## 2020-10-11 PROCEDURE — 82306 VITAMIN D 25 HYDROXY: CPT

## 2020-10-11 PROCEDURE — 85027 COMPLETE CBC AUTOMATED: CPT

## 2020-10-11 PROCEDURE — 84403 ASSAY OF TOTAL TESTOSTERONE: CPT

## 2020-10-12 ENCOUNTER — TELEPHONE (OUTPATIENT)
Dept: ADMINISTRATIVE | Age: 40
End: 2020-10-12

## 2020-10-12 ENCOUNTER — TELEPHONE (OUTPATIENT)
Dept: PRIMARY CARE CLINIC | Age: 40
End: 2020-10-12

## 2020-10-12 LAB
MEASLES IMMUNE (IGG): 2.58
MUV IGG SER QL: 1.86

## 2020-10-12 NOTE — TELEPHONE ENCOUNTER
Covid erroneously reported as pos on spreadsheet. Was brought to my attention after I faxed the information. Left message informing the HD of the error and pointed out that the reportt does clearly show \"negative\" result.

## 2021-06-13 ENCOUNTER — HOSPITAL ENCOUNTER (EMERGENCY)
Age: 41
Discharge: HOME | End: 2021-06-14
Attending: SPECIALIST
Payer: COMMERCIAL

## 2021-06-13 VITALS
HEART RATE: 95 BPM | RESPIRATION RATE: 18 BRPM | DIASTOLIC BLOOD PRESSURE: 97 MMHG | SYSTOLIC BLOOD PRESSURE: 147 MMHG | TEMPERATURE: 97.6 F | BODY MASS INDEX: 22.9 KG/M2 | HEIGHT: 70 IN | OXYGEN SATURATION: 99 % | WEIGHT: 160 LBS

## 2021-06-13 DIAGNOSIS — L55.0 SUNBURN OF FIRST DEGREE: Primary | ICD-10-CM

## 2021-06-13 LAB
ABSOLUTE EOS #: 0.4 K/UL (ref 0–0.4)
ABSOLUTE IMMATURE GRANULOCYTE: ABNORMAL K/UL (ref 0–0.3)
ABSOLUTE LYMPH #: 2.3 K/UL (ref 1–4.8)
ABSOLUTE MONO #: 1.3 K/UL (ref 0.1–1.2)
ANION GAP SERPL CALCULATED.3IONS-SCNC: 7 MMOL/L (ref 9–17)
BASOPHILS # BLD: 0 % (ref 0–2)
BASOPHILS ABSOLUTE: 0.1 K/UL (ref 0–0.2)
BUN BLDV-MCNC: 18 MG/DL (ref 6–20)
BUN/CREAT BLD: ABNORMAL (ref 9–20)
CALCIUM SERPL-MCNC: 9.5 MG/DL (ref 8.6–10.4)
CHLORIDE BLD-SCNC: 100 MMOL/L (ref 98–107)
CO2: 26 MMOL/L (ref 20–31)
CREAT SERPL-MCNC: 0.82 MG/DL (ref 0.7–1.2)
DIFFERENTIAL TYPE: ABNORMAL
EOSINOPHILS RELATIVE PERCENT: 3 % (ref 1–4)
GFR AFRICAN AMERICAN: >60 ML/MIN
GFR NON-AFRICAN AMERICAN: >60 ML/MIN
GFR SERPL CREATININE-BSD FRML MDRD: ABNORMAL ML/MIN/{1.73_M2}
GFR SERPL CREATININE-BSD FRML MDRD: ABNORMAL ML/MIN/{1.73_M2}
GLUCOSE BLD-MCNC: 104 MG/DL (ref 70–99)
HCT VFR BLD CALC: 49.4 % (ref 41–53)
HEMOGLOBIN: 16.9 G/DL (ref 13.5–17.5)
IMMATURE GRANULOCYTES: ABNORMAL %
LYMPHOCYTES # BLD: 17 % (ref 24–44)
MCH RBC QN AUTO: 29.3 PG (ref 26–34)
MCHC RBC AUTO-ENTMCNC: 34.2 G/DL (ref 31–37)
MCV RBC AUTO: 85.6 FL (ref 80–100)
MONOCYTES # BLD: 10 % (ref 2–11)
NRBC AUTOMATED: ABNORMAL PER 100 WBC
PDW BLD-RTO: 13.3 % (ref 12.5–15.4)
PLATELET # BLD: 315 K/UL (ref 140–450)
PLATELET ESTIMATE: ABNORMAL
PMV BLD AUTO: 7 FL (ref 6–12)
POTASSIUM SERPL-SCNC: 3.7 MMOL/L (ref 3.7–5.3)
RBC # BLD: 5.78 M/UL (ref 4.5–5.9)
RBC # BLD: ABNORMAL 10*6/UL
SEG NEUTROPHILS: 70 % (ref 36–66)
SEGMENTED NEUTROPHILS ABSOLUTE COUNT: 9.5 K/UL (ref 1.8–7.7)
SODIUM BLD-SCNC: 133 MMOL/L (ref 135–144)
WBC # BLD: 13.5 K/UL (ref 3.5–11)
WBC # BLD: ABNORMAL 10*3/UL

## 2021-06-13 PROCEDURE — 85025 COMPLETE CBC W/AUTO DIFF WBC: CPT

## 2021-06-13 PROCEDURE — 99284 EMERGENCY DEPT VISIT MOD MDM: CPT

## 2021-06-13 PROCEDURE — 2580000003 HC RX 258: Performed by: SPECIALIST

## 2021-06-13 PROCEDURE — 36415 COLL VENOUS BLD VENIPUNCTURE: CPT

## 2021-06-13 PROCEDURE — 80048 BASIC METABOLIC PNL TOTAL CA: CPT

## 2021-06-13 RX ORDER — 0.9 % SODIUM CHLORIDE 0.9 %
1000 INTRAVENOUS SOLUTION INTRAVENOUS ONCE
Status: COMPLETED | OUTPATIENT
Start: 2021-06-13 | End: 2021-06-13

## 2021-06-13 RX ADMIN — SODIUM CHLORIDE 1000 ML: 9 INJECTION, SOLUTION INTRAVENOUS at 22:48

## 2021-06-13 ASSESSMENT — PAIN SCALES - GENERAL: PAINLEVEL_OUTOF10: 7

## 2021-06-13 ASSESSMENT — PAIN DESCRIPTION - DESCRIPTORS: DESCRIPTORS: ACHING

## 2021-06-13 ASSESSMENT — PAIN DESCRIPTION - PAIN TYPE: TYPE: ACUTE PAIN

## 2021-06-13 ASSESSMENT — PAIN DESCRIPTION - LOCATION: LOCATION: GENERALIZED

## 2021-06-14 NOTE — ED PROVIDER NOTES
Joslyn Jaramillo 1778 ENCOUNTER      Pt Name: Stone Mckeon  MRN: [id number]  [place] 1980  Date of evaluation: 6/14/21      CHIEF COMPLAINT       Chief Complaint   Patient presents with    Sunburn     patient sts he was lying in the sun from 1330 to 1600, sts 45 minutes pta his skin started to feel \"creepy crawly\" and he began shaking while taking a luke warm bath. HISTORY OF PRESENT ILLNESS    Joce Mckeon is a 46 y.o. male who presents to the emergency department accompanied by his family after patient was lying outside with bright sunlight with hot temperature continuously for 2-1/2 hours between 1:30 PM to 4 PM in the afternoon and about 45 minutes prior to arrival, patient states his skin started to feel uneasy creeping and crawling and then he began shaking while taking lukewarm bath at about 9 PM prior to arrival.  The sunburn has occurred on the face, anterior neck, chest, abdomen, upper and lower extremities as patient was lying with only swimsuit on. There is no sunburn on the posterior aspect of the body. He denies any fever and denies feeling lightheaded, weak or dizzy. Patient states his appetite has been normal and he thinks that fluid intake is adequate. There are no exacerbating or relieving factors and patient has not taken any medications for the above symptoms. He describes pain as generalized body ache 7 out of 10 in intensity. REVIEW OF SYSTEMS       Review of Systems    All systems reviewed and negative unless noted in HPI. The patient denies fever or constitutional symptoms. Denies vision change. Denies any sore throat or rhinorrhea. Denies any neck pain or stiffness. Denies chest pain or shortness of breath. No nausea,  vomiting or diarrhea. Denies any dysuria. Denies urinary frequency or hematuria. Denies musculoskeletal injury or pain. Denies any weakness, numbness or focal neurologic deficit.     No easy bruising or bleeding. Denies any polyuria, polydypsia       PAST MEDICAL HISTORY    has a past medical history of ADHD, Anxiety, Depression, and Tourette's. SURGICAL HISTORY      has a past surgical history that includes cyst removal.    CURRENT MEDICATIONS       Discharge Medication List as of 6/13/2021 11:56 PM      CONTINUE these medications which have NOT CHANGED    Details   haloperidol (HALDOL) 1 MG tablet Take 1 mg by mouth 2 times daily Medication verified @ [facility] on 12/29/17. Historical Med      montelukast (SINGULAIR) 10 MG tablet Take 10 mg by mouth nightly Medication verified @ [facility] on 12/29/17. Historical Med      sertraline (ZOLOFT) 50 MG tablet Take 50 mg by mouth dailyHistorical Med      clonazePAM (KLONOPIN) 0.5 MG tablet Take 0.5 mg by mouth 2 times daily as needed. Ruth Bae Historical Med      buPROPion (WELLBUTRIN XL) 300 MG extended release tablet Take 300 mg by mouth every morning Medication verified @@ [facility] on 12/29/17. Historical Med             ALLERGIES     has No Known Allergies. FAMILY HISTORY     He indicated that the status of his mother is unknown. He indicated that the status of his father is unknown. He indicated that the status of his other is unknown.     family history includes Cancer in his father and mother; Coronary Art Dis in his father and another family member; Schizophrenia in an other family member. SOCIAL HISTORY      reports that he has never smoked. He has never used smokeless tobacco. He reports current alcohol use. He reports that he does not use drugs. PHYSICAL EXAM     INITIAL VITALS:  height is 5' 10\" (1.778 m) and weight is 72.6 kg (160 lb). His oral temperature is 97.6 °F (36.4 °C). His blood pressure is 147/97 (abnormal) and his pulse is 95. His respiration is 18 and oxygen saturation is 99%. Physical Exam  Vitals and nursing note reviewed. Constitutional:       Appearance: He is well-developed. HENT:      Head: Normocephalic and atraumatic. Nose: Nose normal.   Eyes:      Extraocular Movements: Extraocular movements intact. Pupils: Pupils are equal, round, and reactive to light. Cardiovascular:      Rate and Rhythm: Normal rate and regular rhythm. Heart sounds: Normal heart sounds. No murmur heard. Pulmonary:      Effort: Pulmonary effort is normal. No respiratory distress. Breath sounds: Normal breath sounds. Abdominal:      General: Bowel sounds are normal. There is no distension. Palpations: Abdomen is soft. Tenderness: There is no abdominal tenderness. Musculoskeletal:      Cervical back: Normal range of motion and neck supple. Skin:     General: Skin is warm and dry. Findings: Burn present. Comments: Patient has erythema secondary to sunburn on the face, anterior neck, chest, abdomen, anterior aspect of the upper and lower extremities. None of the burns are circumferential.  There are no second or third-degree burns. Neurovascular examination is intact distally. Neurological:      General: No focal deficit present. Mental Status: He is alert and oriented to person, place, and time. DIFFERENTIAL DIAGNOSIS/ MDM:     First-degree burn face, neck, trunk and extremities secondary to sunburn    DIAGNOSTIC RESULTS     EKG: All EKG's are interpreted by the Emergency Department Physician who either signs or Co-signs this chart in the absence of a cardiologist.    None obtained    RADIOLOGY:   Interpretation per the Radiologist below, if available at the time of this note:    No results found.       LABS:  Results for orders placed or performed during the hospital encounter of 06/13/21   CBC Auto Differential   Result Value Ref Range    WBC 13.5 (H) 3.5 - 11.0 k/uL    RBC 5.78 4.5 - 5.9 m/uL    Hemoglobin 16.9 13.5 - 17.5 g/dL    Hematocrit 49.4 41 - 53 %    MCV 85.6 80 - 100 fL    MCH 29.3 26 - 34 pg    MCHC 34.2 31 - 37 g/dL    RDW 13.3 12.5 - 15.4 %    Platelets 117 256 - 527 k/uL    MPV exposure, follow-up with PCP, return if worse. CONSULTS:  None    PROCEDURES:  None    FINAL IMPRESSION      1. Sunburn of first degree          DISPOSITION/PLAN       PATIENT REFERRED TO:  Ruben Hdz MD  [address]  [phone]    Call in 2 days  For reevaluation of current symptoms    [facility] ED  [address] [place] [address]  [phone]    If symptoms worsen      DISCHARGE MEDICATIONS:  Discharge Medication List as of 6/13/2021 11:56 PM          (Please note that portions of this note were completed with a voice recognition program.  Efforts were made to edit the dictations but occasionally words are mis-transcribed.)    Yudy Hidalgo MD,, MD, F.A.C.E.P.   Attending Emergency Medicine Physician      Yudy Hidalgo MD  06/14/21 2631

## 2022-04-19 ENCOUNTER — HOSPITAL ENCOUNTER (OUTPATIENT)
Age: 42
Discharge: HOME | End: 2022-04-19
Payer: COMMERCIAL

## 2022-04-19 PROCEDURE — 36415 COLL VENOUS BLD VENIPUNCTURE: CPT

## 2022-04-19 PROCEDURE — 84146 ASSAY OF PROLACTIN: CPT

## 2022-04-20 LAB — PROLACTIN: 17.6 NG/ML (ref 4.04–15.2)

## 2023-01-07 ENCOUNTER — APPOINTMENT (OUTPATIENT)
Dept: GENERAL RADIOLOGY | Age: 43
DRG: 339 | End: 2023-01-07
Payer: COMMERCIAL

## 2023-01-07 ENCOUNTER — APPOINTMENT (OUTPATIENT)
Dept: CT IMAGING | Age: 43
DRG: 339 | End: 2023-01-07
Payer: COMMERCIAL

## 2023-01-07 ENCOUNTER — APPOINTMENT (OUTPATIENT)
Dept: ULTRASOUND IMAGING | Age: 43
DRG: 339 | End: 2023-01-07
Payer: COMMERCIAL

## 2023-01-07 ENCOUNTER — ANESTHESIA (OUTPATIENT)
Dept: OPERATING ROOM | Age: 43
End: 2023-01-07
Payer: COMMERCIAL

## 2023-01-07 ENCOUNTER — HOSPITAL ENCOUNTER (INPATIENT)
Age: 43
LOS: 2 days | Discharge: HOME | DRG: 339 | End: 2023-01-09
Attending: EMERGENCY MEDICINE | Admitting: STUDENT IN AN ORGANIZED HEALTH CARE EDUCATION/TRAINING PROGRAM
Payer: COMMERCIAL

## 2023-01-07 ENCOUNTER — ANESTHESIA EVENT (OUTPATIENT)
Dept: OPERATING ROOM | Age: 43
End: 2023-01-07
Payer: COMMERCIAL

## 2023-01-07 DIAGNOSIS — K35.21 ACUTE APPENDICITIS WITH GENERALIZED PERITONITIS AND ABSCESS, WITHOUT GANGRENE, UNSPECIFIED WHETHER PERFORATION PRESENT: Primary | ICD-10-CM

## 2023-01-07 PROBLEM — K35.219 ACUTE APPENDICITIS WITH GENERALIZED PERITONITIS AND ABSCESS, WITHOUT GANGRENE: Status: ACTIVE | Noted: 2023-01-07

## 2023-01-07 PROBLEM — K35.33 APPENDICEAL ABSCESS: Status: ACTIVE | Noted: 2023-01-07

## 2023-01-07 LAB
ABSOLUTE EOS #: 0.47 K/UL (ref 0–0.4)
ABSOLUTE LYMPH #: 4.34 K/UL (ref 1–4.8)
ABSOLUTE MONO #: 1.55 K/UL (ref 0.1–0.8)
ALBUMIN SERPL-MCNC: 4.3 G/DL (ref 3.5–5.2)
ALBUMIN/GLOBULIN RATIO: 2 (ref 1–2.5)
ALP BLD-CCNC: 69 U/L (ref 40–129)
ALT SERPL-CCNC: 28 U/L (ref 5–41)
ANION GAP SERPL CALCULATED.3IONS-SCNC: 13 MMOL/L (ref 9–17)
AST SERPL-CCNC: 19 U/L
BASOPHILS # BLD: 0 % (ref 0–2)
BASOPHILS ABSOLUTE: 0 K/UL (ref 0–0.2)
BILIRUB SERPL-MCNC: 0.4 MG/DL (ref 0.3–1.2)
BILIRUBIN URINE: NEGATIVE
BUN BLDV-MCNC: 15 MG/DL (ref 6–20)
CALCIUM SERPL-MCNC: 9 MG/DL (ref 8.6–10.4)
CHLORIDE BLD-SCNC: 106 MMOL/L (ref 98–107)
CO2: 24 MMOL/L (ref 20–31)
COLOR: YELLOW
COMMENT UA: NORMAL
CREAT SERPL-MCNC: 0.9 MG/DL (ref 0.7–1.2)
EOSINOPHILS RELATIVE PERCENT: 3 % (ref 1–4)
GFR SERPL CREATININE-BSD FRML MDRD: >60 ML/MIN/1.73M2
GLUCOSE BLD-MCNC: 144 MG/DL (ref 70–99)
GLUCOSE URINE: NEGATIVE
HCT VFR BLD CALC: 46.4 % (ref 41–53)
HEMOGLOBIN: 16.5 G/DL (ref 13.5–17.5)
KETONES, URINE: NEGATIVE
LEUKOCYTE ESTERASE, URINE: NEGATIVE
LIPASE: 21 U/L (ref 13–60)
LYMPHOCYTES # BLD: 28 % (ref 24–44)
MAGNESIUM: 2.6 MG/DL (ref 1.6–2.6)
MCH RBC QN AUTO: 30.1 PG (ref 26–34)
MCHC RBC AUTO-ENTMCNC: 35.6 G/DL (ref 31–37)
MCV RBC AUTO: 84.5 FL (ref 80–100)
MONOCYTES # BLD: 10 % (ref 1–7)
MORPHOLOGY: NORMAL
NITRITE, URINE: NEGATIVE
PDW BLD-RTO: 13 % (ref 12.5–15.4)
PH UA: 5.5 (ref 5–8)
PLATELET # BLD: 325 K/UL (ref 140–450)
PMV BLD AUTO: 9 FL (ref 8–14)
POTASSIUM SERPL-SCNC: 4 MMOL/L (ref 3.7–5.3)
PROTEIN UA: NEGATIVE
RBC # BLD: 5.49 M/UL (ref 4.5–5.9)
SEG NEUTROPHILS: 59 % (ref 36–66)
SEGMENTED NEUTROPHILS ABSOLUTE COUNT: 9.14 K/UL (ref 1.8–7.7)
SODIUM BLD-SCNC: 143 MMOL/L (ref 135–144)
SPECIFIC GRAVITY UA: 1.02 (ref 1–1.03)
TOTAL PROTEIN: 6.4 G/DL (ref 6.4–8.3)
TURBIDITY: CLEAR
URINE HGB: NEGATIVE
UROBILINOGEN, URINE: NORMAL
WBC # BLD: 15.5 K/UL (ref 3.5–11)

## 2023-01-07 PROCEDURE — 6360000002 HC RX W HCPCS

## 2023-01-07 PROCEDURE — 6360000002 HC RX W HCPCS: Performed by: SURGERY

## 2023-01-07 PROCEDURE — 99222 1ST HOSP IP/OBS MODERATE 55: CPT | Performed by: SURGERY

## 2023-01-07 PROCEDURE — 36415 COLL VENOUS BLD VENIPUNCTURE: CPT

## 2023-01-07 PROCEDURE — 74018 RADEX ABDOMEN 1 VIEW: CPT

## 2023-01-07 PROCEDURE — S2900 ROBOTIC SURGICAL SYSTEM: HCPCS | Performed by: SURGERY

## 2023-01-07 PROCEDURE — 99285 EMERGENCY DEPT VISIT HI MDM: CPT

## 2023-01-07 PROCEDURE — 6370000000 HC RX 637 (ALT 250 FOR IP): Performed by: HOSPITALIST

## 2023-01-07 PROCEDURE — 6360000002 HC RX W HCPCS: Performed by: NURSE PRACTITIONER

## 2023-01-07 PROCEDURE — 6360000002 HC RX W HCPCS: Performed by: ANESTHESIOLOGY

## 2023-01-07 PROCEDURE — 2500000003 HC RX 250 WO HCPCS: Performed by: ANESTHESIOLOGY

## 2023-01-07 PROCEDURE — 80053 COMPREHEN METABOLIC PANEL: CPT

## 2023-01-07 PROCEDURE — 88304 TISSUE EXAM BY PATHOLOGIST: CPT

## 2023-01-07 PROCEDURE — 3600000009 HC SURGERY ROBOT BASE: Performed by: SURGERY

## 2023-01-07 PROCEDURE — 8E0W4CZ ROBOTIC ASSISTED PROCEDURE OF TRUNK REGION, PERCUTANEOUS ENDOSCOPIC APPROACH: ICD-10-PCS | Performed by: SURGERY

## 2023-01-07 PROCEDURE — 2060000000 HC ICU INTERMEDIATE R&B

## 2023-01-07 PROCEDURE — 7100000000 HC PACU RECOVERY - FIRST 15 MIN: Performed by: SURGERY

## 2023-01-07 PROCEDURE — 83735 ASSAY OF MAGNESIUM: CPT

## 2023-01-07 PROCEDURE — 2580000003 HC RX 258: Performed by: SURGERY

## 2023-01-07 PROCEDURE — 7100000001 HC PACU RECOVERY - ADDTL 15 MIN: Performed by: SURGERY

## 2023-01-07 PROCEDURE — 85025 COMPLETE CBC W/AUTO DIFF WBC: CPT

## 2023-01-07 PROCEDURE — 93976 VASCULAR STUDY: CPT

## 2023-01-07 PROCEDURE — 2580000003 HC RX 258: Performed by: HOSPITALIST

## 2023-01-07 PROCEDURE — 6360000002 HC RX W HCPCS: Performed by: EMERGENCY MEDICINE

## 2023-01-07 PROCEDURE — 3600000019 HC SURGERY ROBOT ADDTL 15MIN: Performed by: SURGERY

## 2023-01-07 PROCEDURE — 6370000000 HC RX 637 (ALT 250 FOR IP): Performed by: SURGERY

## 2023-01-07 PROCEDURE — 96375 TX/PRO/DX INJ NEW DRUG ADDON: CPT

## 2023-01-07 PROCEDURE — 3700000001 HC ADD 15 MINUTES (ANESTHESIA): Performed by: SURGERY

## 2023-01-07 PROCEDURE — 2580000003 HC RX 258: Performed by: EMERGENCY MEDICINE

## 2023-01-07 PROCEDURE — 99222 1ST HOSP IP/OBS MODERATE 55: CPT | Performed by: HOSPITALIST

## 2023-01-07 PROCEDURE — 2709999900 HC NON-CHARGEABLE SUPPLY: Performed by: SURGERY

## 2023-01-07 PROCEDURE — 96374 THER/PROPH/DIAG INJ IV PUSH: CPT

## 2023-01-07 PROCEDURE — 2580000003 HC RX 258: Performed by: NURSE PRACTITIONER

## 2023-01-07 PROCEDURE — 3700000000 HC ANESTHESIA ATTENDED CARE: Performed by: SURGERY

## 2023-01-07 PROCEDURE — 0DTJ4ZZ RESECTION OF APPENDIX, PERCUTANEOUS ENDOSCOPIC APPROACH: ICD-10-PCS | Performed by: SURGERY

## 2023-01-07 PROCEDURE — 83690 ASSAY OF LIPASE: CPT

## 2023-01-07 PROCEDURE — 2500000003 HC RX 250 WO HCPCS: Performed by: SURGERY

## 2023-01-07 PROCEDURE — 74176 CT ABD & PELVIS W/O CONTRAST: CPT

## 2023-01-07 PROCEDURE — 2580000003 HC RX 258: Performed by: ANESTHESIOLOGY

## 2023-01-07 PROCEDURE — 81003 URINALYSIS AUTO W/O SCOPE: CPT

## 2023-01-07 PROCEDURE — 2720000010 HC SURG SUPPLY STERILE: Performed by: SURGERY

## 2023-01-07 RX ORDER — KETOROLAC TROMETHAMINE 30 MG/ML
30 INJECTION, SOLUTION INTRAMUSCULAR; INTRAVENOUS EVERY 6 HOURS
Status: COMPLETED | OUTPATIENT
Start: 2023-01-07 | End: 2023-01-08

## 2023-01-07 RX ORDER — SODIUM CHLORIDE 9 MG/ML
INJECTION, SOLUTION INTRAVENOUS CONTINUOUS
Status: DISCONTINUED | OUTPATIENT
Start: 2023-01-07 | End: 2023-01-09

## 2023-01-07 RX ORDER — ONDANSETRON 4 MG/1
4 TABLET, ORALLY DISINTEGRATING ORAL EVERY 8 HOURS PRN
Status: DISCONTINUED | OUTPATIENT
Start: 2023-01-07 | End: 2023-01-09 | Stop reason: HOSPADM

## 2023-01-07 RX ORDER — ONDANSETRON 2 MG/ML
4 INJECTION INTRAMUSCULAR; INTRAVENOUS EVERY 6 HOURS PRN
Status: DISCONTINUED | OUTPATIENT
Start: 2023-01-07 | End: 2023-01-09 | Stop reason: HOSPADM

## 2023-01-07 RX ORDER — SODIUM CHLORIDE, SODIUM LACTATE, POTASSIUM CHLORIDE, CALCIUM CHLORIDE 600; 310; 30; 20 MG/100ML; MG/100ML; MG/100ML; MG/100ML
INJECTION, SOLUTION INTRAVENOUS CONTINUOUS PRN
Status: DISCONTINUED | OUTPATIENT
Start: 2023-01-07 | End: 2023-01-07 | Stop reason: SDUPTHER

## 2023-01-07 RX ORDER — LABETALOL HYDROCHLORIDE 5 MG/ML
10 INJECTION, SOLUTION INTRAVENOUS
Status: DISCONTINUED | OUTPATIENT
Start: 2023-01-07 | End: 2023-01-07 | Stop reason: HOSPADM

## 2023-01-07 RX ORDER — LIDOCAINE HYDROCHLORIDE 10 MG/ML
INJECTION, SOLUTION EPIDURAL; INFILTRATION; INTRACAUDAL; PERINEURAL PRN
Status: DISCONTINUED | OUTPATIENT
Start: 2023-01-07 | End: 2023-01-07 | Stop reason: SDUPTHER

## 2023-01-07 RX ORDER — SODIUM CHLORIDE 9 MG/ML
INJECTION, SOLUTION INTRAVENOUS PRN
Status: DISCONTINUED | OUTPATIENT
Start: 2023-01-07 | End: 2023-01-09 | Stop reason: HOSPADM

## 2023-01-07 RX ORDER — MIDAZOLAM HYDROCHLORIDE 1 MG/ML
INJECTION INTRAMUSCULAR; INTRAVENOUS PRN
Status: DISCONTINUED | OUTPATIENT
Start: 2023-01-07 | End: 2023-01-07 | Stop reason: SDUPTHER

## 2023-01-07 RX ORDER — SODIUM CHLORIDE 0.9 % (FLUSH) 0.9 %
5-40 SYRINGE (ML) INJECTION EVERY 12 HOURS SCHEDULED
Status: DISCONTINUED | OUTPATIENT
Start: 2023-01-07 | End: 2023-01-09 | Stop reason: HOSPADM

## 2023-01-07 RX ORDER — FENTANYL CITRATE 50 UG/ML
50 INJECTION, SOLUTION INTRAMUSCULAR; INTRAVENOUS ONCE
Status: COMPLETED | OUTPATIENT
Start: 2023-01-07 | End: 2023-01-07

## 2023-01-07 RX ORDER — SODIUM CHLORIDE 9 MG/ML
INJECTION, SOLUTION INTRAVENOUS PRN
Status: DISCONTINUED | OUTPATIENT
Start: 2023-01-07 | End: 2023-01-07 | Stop reason: HOSPADM

## 2023-01-07 RX ORDER — POTASSIUM CHLORIDE 7.45 MG/ML
10 INJECTION INTRAVENOUS PRN
Status: DISCONTINUED | OUTPATIENT
Start: 2023-01-07 | End: 2023-01-09 | Stop reason: HOSPADM

## 2023-01-07 RX ORDER — ROCURONIUM BROMIDE 10 MG/ML
INJECTION, SOLUTION INTRAVENOUS PRN
Status: DISCONTINUED | OUTPATIENT
Start: 2023-01-07 | End: 2023-01-07 | Stop reason: SDUPTHER

## 2023-01-07 RX ORDER — MAGNESIUM SULFATE 1 G/100ML
1000 INJECTION INTRAVENOUS PRN
Status: DISCONTINUED | OUTPATIENT
Start: 2023-01-07 | End: 2023-01-09 | Stop reason: HOSPADM

## 2023-01-07 RX ORDER — DROPERIDOL 2.5 MG/ML
0.62 INJECTION, SOLUTION INTRAMUSCULAR; INTRAVENOUS
Status: DISCONTINUED | OUTPATIENT
Start: 2023-01-07 | End: 2023-01-07 | Stop reason: HOSPADM

## 2023-01-07 RX ORDER — HALOPERIDOL 1 MG/1
1 TABLET ORAL 2 TIMES DAILY
Status: DISCONTINUED | OUTPATIENT
Start: 2023-01-08 | End: 2023-01-09 | Stop reason: HOSPADM

## 2023-01-07 RX ORDER — PROPOFOL 10 MG/ML
INJECTION, EMULSION INTRAVENOUS PRN
Status: DISCONTINUED | OUTPATIENT
Start: 2023-01-07 | End: 2023-01-07 | Stop reason: SDUPTHER

## 2023-01-07 RX ORDER — LISDEXAMFETAMINE DIMESYLATE 60 MG/1
60 CAPSULE ORAL DAILY
COMMUNITY
Start: 2022-07-07

## 2023-01-07 RX ORDER — MIDAZOLAM HYDROCHLORIDE 2 MG/2ML
2 INJECTION, SOLUTION INTRAMUSCULAR; INTRAVENOUS
Status: DISCONTINUED | OUTPATIENT
Start: 2023-01-07 | End: 2023-01-07 | Stop reason: HOSPADM

## 2023-01-07 RX ORDER — OXYCODONE HYDROCHLORIDE 5 MG/1
10 TABLET ORAL PRN
Status: DISCONTINUED | OUTPATIENT
Start: 2023-01-07 | End: 2023-01-07 | Stop reason: HOSPADM

## 2023-01-07 RX ORDER — ONDANSETRON 2 MG/ML
INJECTION INTRAMUSCULAR; INTRAVENOUS PRN
Status: DISCONTINUED | OUTPATIENT
Start: 2023-01-07 | End: 2023-01-07 | Stop reason: SDUPTHER

## 2023-01-07 RX ORDER — NEOSTIGMINE METHYLSULFATE 5 MG/5 ML
SYRINGE (ML) INTRAVENOUS PRN
Status: DISCONTINUED | OUTPATIENT
Start: 2023-01-07 | End: 2023-01-07 | Stop reason: SDUPTHER

## 2023-01-07 RX ORDER — ATORVASTATIN CALCIUM 10 MG/1
10 TABLET, FILM COATED ORAL DAILY
COMMUNITY
Start: 2022-08-08

## 2023-01-07 RX ORDER — PROCHLORPERAZINE EDISYLATE 5 MG/ML
5 INJECTION INTRAMUSCULAR; INTRAVENOUS
Status: DISCONTINUED | OUTPATIENT
Start: 2023-01-07 | End: 2023-01-07 | Stop reason: HOSPADM

## 2023-01-07 RX ORDER — SODIUM CHLORIDE 0.9 % (FLUSH) 0.9 %
5-40 SYRINGE (ML) INJECTION PRN
Status: DISCONTINUED | OUTPATIENT
Start: 2023-01-07 | End: 2023-01-07 | Stop reason: HOSPADM

## 2023-01-07 RX ORDER — OXYCODONE HYDROCHLORIDE 5 MG/1
5 TABLET ORAL PRN
Status: DISCONTINUED | OUTPATIENT
Start: 2023-01-07 | End: 2023-01-07 | Stop reason: HOSPADM

## 2023-01-07 RX ORDER — SODIUM CHLORIDE 0.9 % (FLUSH) 0.9 %
10 SYRINGE (ML) INJECTION PRN
Status: DISCONTINUED | OUTPATIENT
Start: 2023-01-07 | End: 2023-01-09 | Stop reason: HOSPADM

## 2023-01-07 RX ORDER — 0.9 % SODIUM CHLORIDE 0.9 %
1000 INTRAVENOUS SOLUTION INTRAVENOUS ONCE
Status: COMPLETED | OUTPATIENT
Start: 2023-01-07 | End: 2023-01-07

## 2023-01-07 RX ORDER — IPRATROPIUM BROMIDE AND ALBUTEROL SULFATE 2.5; .5 MG/3ML; MG/3ML
1 SOLUTION RESPIRATORY (INHALATION)
Status: DISCONTINUED | OUTPATIENT
Start: 2023-01-07 | End: 2023-01-07 | Stop reason: HOSPADM

## 2023-01-07 RX ORDER — GLYCOPYRROLATE 1 MG/5 ML
SYRINGE (ML) INTRAVENOUS PRN
Status: DISCONTINUED | OUTPATIENT
Start: 2023-01-07 | End: 2023-01-07 | Stop reason: SDUPTHER

## 2023-01-07 RX ORDER — ACETAMINOPHEN 325 MG/1
650 TABLET ORAL EVERY 6 HOURS PRN
Status: DISCONTINUED | OUTPATIENT
Start: 2023-01-07 | End: 2023-01-09 | Stop reason: HOSPADM

## 2023-01-07 RX ORDER — KETOROLAC TROMETHAMINE 30 MG/ML
INJECTION, SOLUTION INTRAMUSCULAR; INTRAVENOUS PRN
Status: DISCONTINUED | OUTPATIENT
Start: 2023-01-07 | End: 2023-01-07 | Stop reason: SDUPTHER

## 2023-01-07 RX ORDER — POLYETHYLENE GLYCOL 3350 17 G/17G
17 POWDER, FOR SOLUTION ORAL DAILY PRN
Status: DISCONTINUED | OUTPATIENT
Start: 2023-01-07 | End: 2023-01-09 | Stop reason: HOSPADM

## 2023-01-07 RX ORDER — DIPHENHYDRAMINE HYDROCHLORIDE 50 MG/ML
12.5 INJECTION INTRAMUSCULAR; INTRAVENOUS
Status: DISCONTINUED | OUTPATIENT
Start: 2023-01-07 | End: 2023-01-07 | Stop reason: HOSPADM

## 2023-01-07 RX ORDER — FENTANYL CITRATE 50 UG/ML
25 INJECTION, SOLUTION INTRAMUSCULAR; INTRAVENOUS EVERY 5 MIN PRN
Status: DISCONTINUED | OUTPATIENT
Start: 2023-01-07 | End: 2023-01-07 | Stop reason: HOSPADM

## 2023-01-07 RX ORDER — POTASSIUM CHLORIDE 20 MEQ/1
40 TABLET, EXTENDED RELEASE ORAL PRN
Status: DISCONTINUED | OUTPATIENT
Start: 2023-01-07 | End: 2023-01-09 | Stop reason: HOSPADM

## 2023-01-07 RX ORDER — SODIUM CHLORIDE 0.9 % (FLUSH) 0.9 %
5-40 SYRINGE (ML) INJECTION EVERY 12 HOURS SCHEDULED
Status: DISCONTINUED | OUTPATIENT
Start: 2023-01-07 | End: 2023-01-07 | Stop reason: HOSPADM

## 2023-01-07 RX ORDER — FENTANYL CITRATE 50 UG/ML
INJECTION, SOLUTION INTRAMUSCULAR; INTRAVENOUS PRN
Status: DISCONTINUED | OUTPATIENT
Start: 2023-01-07 | End: 2023-01-07 | Stop reason: SDUPTHER

## 2023-01-07 RX ORDER — ONDANSETRON 2 MG/ML
4 INJECTION INTRAMUSCULAR; INTRAVENOUS ONCE
Status: COMPLETED | OUTPATIENT
Start: 2023-01-07 | End: 2023-01-07

## 2023-01-07 RX ORDER — DEXAMETHASONE SODIUM PHOSPHATE 10 MG/ML
INJECTION INTRAMUSCULAR; INTRAVENOUS PRN
Status: DISCONTINUED | OUTPATIENT
Start: 2023-01-07 | End: 2023-01-07 | Stop reason: SDUPTHER

## 2023-01-07 RX ORDER — ATORVASTATIN CALCIUM 10 MG/1
10 TABLET, FILM COATED ORAL DAILY
Status: DISCONTINUED | OUTPATIENT
Start: 2023-01-07 | End: 2023-01-09 | Stop reason: HOSPADM

## 2023-01-07 RX ORDER — MONTELUKAST SODIUM 10 MG/1
10 TABLET ORAL NIGHTLY
Status: DISCONTINUED | OUTPATIENT
Start: 2023-01-07 | End: 2023-01-09 | Stop reason: HOSPADM

## 2023-01-07 RX ORDER — KETOROLAC TROMETHAMINE 30 MG/ML
30 INJECTION, SOLUTION INTRAMUSCULAR; INTRAVENOUS ONCE
Status: COMPLETED | OUTPATIENT
Start: 2023-01-07 | End: 2023-01-07

## 2023-01-07 RX ADMIN — MONTELUKAST 10 MG: 10 TABLET, FILM COATED ORAL at 21:34

## 2023-01-07 RX ADMIN — SODIUM CHLORIDE, POTASSIUM CHLORIDE, SODIUM LACTATE AND CALCIUM CHLORIDE: 600; 310; 30; 20 INJECTION, SOLUTION INTRAVENOUS at 18:12

## 2023-01-07 RX ADMIN — ONDANSETRON 4 MG: 2 INJECTION INTRAMUSCULAR; INTRAVENOUS at 02:08

## 2023-01-07 RX ADMIN — PIPERACILLIN AND TAZOBACTAM 3375 MG: 3; .375 INJECTION, POWDER, FOR SOLUTION INTRAVENOUS at 12:02

## 2023-01-07 RX ADMIN — Medication 5 MG: at 19:18

## 2023-01-07 RX ADMIN — PROPOFOL 170 MG: 10 INJECTION, EMULSION INTRAVENOUS at 18:14

## 2023-01-07 RX ADMIN — FENTANYL CITRATE 50 MCG: 50 INJECTION, SOLUTION INTRAMUSCULAR; INTRAVENOUS at 02:07

## 2023-01-07 RX ADMIN — HYDROMORPHONE HYDROCHLORIDE 0.5 MG: 1 INJECTION, SOLUTION INTRAMUSCULAR; INTRAVENOUS; SUBCUTANEOUS at 20:02

## 2023-01-07 RX ADMIN — Medication 0.5 MG: at 19:53

## 2023-01-07 RX ADMIN — KETOROLAC TROMETHAMINE 30 MG: 30 INJECTION, SOLUTION INTRAMUSCULAR at 18:50

## 2023-01-07 RX ADMIN — SODIUM CHLORIDE: 9 INJECTION, SOLUTION INTRAVENOUS at 21:39

## 2023-01-07 RX ADMIN — LIDOCAINE HYDROCHLORIDE 50 MG: 10 INJECTION, SOLUTION EPIDURAL; INFILTRATION; INTRACAUDAL; PERINEURAL at 18:36

## 2023-01-07 RX ADMIN — DEXAMETHASONE SODIUM PHOSPHATE 10 MG: 10 INJECTION INTRAMUSCULAR; INTRAVENOUS at 18:24

## 2023-01-07 RX ADMIN — HYDROMORPHONE HYDROCHLORIDE 1 MG: 1 INJECTION, SOLUTION INTRAMUSCULAR; INTRAVENOUS; SUBCUTANEOUS at 05:49

## 2023-01-07 RX ADMIN — KETOROLAC TROMETHAMINE 30 MG: 30 INJECTION, SOLUTION INTRAMUSCULAR; INTRAVENOUS at 21:32

## 2023-01-07 RX ADMIN — Medication 1 MG: at 19:18

## 2023-01-07 RX ADMIN — HYDROMORPHONE HYDROCHLORIDE 1 MG: 1 INJECTION, SOLUTION INTRAMUSCULAR; INTRAVENOUS; SUBCUTANEOUS at 11:55

## 2023-01-07 RX ADMIN — Medication 0.5 MG: at 20:02

## 2023-01-07 RX ADMIN — SODIUM CHLORIDE: 9 INJECTION, SOLUTION INTRAVENOUS at 12:11

## 2023-01-07 RX ADMIN — SODIUM CHLORIDE, PRESERVATIVE FREE 10 ML: 5 INJECTION INTRAVENOUS at 12:10

## 2023-01-07 RX ADMIN — PIPERACILLIN AND TAZOBACTAM 4500 MG: 4; .5 INJECTION, POWDER, FOR SOLUTION INTRAVENOUS at 04:05

## 2023-01-07 RX ADMIN — ONDANSETRON 4 MG: 2 INJECTION INTRAMUSCULAR; INTRAVENOUS at 18:52

## 2023-01-07 RX ADMIN — PIPERACILLIN AND TAZOBACTAM 3375 MG: 3; .375 INJECTION, POWDER, FOR SOLUTION INTRAVENOUS at 21:37

## 2023-01-07 RX ADMIN — HYDROMORPHONE HYDROCHLORIDE 1 MG: 1 INJECTION, SOLUTION INTRAMUSCULAR; INTRAVENOUS; SUBCUTANEOUS at 15:30

## 2023-01-07 RX ADMIN — HYDROMORPHONE HYDROCHLORIDE 0.5 MG: 1 INJECTION, SOLUTION INTRAMUSCULAR; INTRAVENOUS; SUBCUTANEOUS at 19:53

## 2023-01-07 RX ADMIN — PROPOFOL 160 MG: 10 INJECTION, EMULSION INTRAVENOUS at 18:15

## 2023-01-07 RX ADMIN — SERTRALINE HYDROCHLORIDE 50 MG: 50 TABLET ORAL at 13:12

## 2023-01-07 RX ADMIN — ROCURONIUM BROMIDE 50 MG: 10 INJECTION INTRAVENOUS at 18:15

## 2023-01-07 RX ADMIN — ATORVASTATIN CALCIUM 10 MG: 10 TABLET, FILM COATED ORAL at 13:12

## 2023-01-07 RX ADMIN — FENTANYL CITRATE 100 MCG: 50 INJECTION, SOLUTION INTRAMUSCULAR; INTRAVENOUS at 18:14

## 2023-01-07 RX ADMIN — SODIUM CHLORIDE 1000 ML: 9 INJECTION, SOLUTION INTRAVENOUS at 02:05

## 2023-01-07 RX ADMIN — KETOROLAC TROMETHAMINE 30 MG: 30 INJECTION, SOLUTION INTRAMUSCULAR; INTRAVENOUS at 02:06

## 2023-01-07 RX ADMIN — LIDOCAINE HYDROCHLORIDE 50 MG: 10 INJECTION, SOLUTION EPIDURAL; INFILTRATION; INTRACAUDAL; PERINEURAL at 18:14

## 2023-01-07 RX ADMIN — MIDAZOLAM 2 MG: 1 INJECTION INTRAMUSCULAR; INTRAVENOUS at 18:14

## 2023-01-07 ASSESSMENT — PAIN SCALES - GENERAL
PAINLEVEL_OUTOF10: 6
PAINLEVEL_OUTOF10: 3
PAINLEVEL_OUTOF10: 7
PAINLEVEL_OUTOF10: 8
PAINLEVEL_OUTOF10: 6
PAINLEVEL_OUTOF10: 5
PAINLEVEL_OUTOF10: 7
PAINLEVEL_OUTOF10: 7
PAINLEVEL_OUTOF10: 10
PAINLEVEL_OUTOF10: 7
PAINLEVEL_OUTOF10: 8
PAINLEVEL_OUTOF10: 2

## 2023-01-07 ASSESSMENT — PAIN DESCRIPTION - DESCRIPTORS
DESCRIPTORS: ACHING
DESCRIPTORS: ACHING;SHARP
DESCRIPTORS: ACHING
DESCRIPTORS: ACHING

## 2023-01-07 ASSESSMENT — PAIN DESCRIPTION - PAIN TYPE
TYPE: SURGICAL PAIN

## 2023-01-07 ASSESSMENT — PAIN DESCRIPTION - LOCATION
LOCATION: ABDOMEN
LOCATION: ANKLE

## 2023-01-07 ASSESSMENT — ENCOUNTER SYMPTOMS
VOMITING: 0
ABDOMINAL PAIN: 1
SHORTNESS OF BREATH: 0
SORE THROAT: 0
DIARRHEA: 0
NAUSEA: 1

## 2023-01-07 ASSESSMENT — PAIN DESCRIPTION - ORIENTATION: ORIENTATION: RIGHT;LOWER

## 2023-01-07 ASSESSMENT — PAIN - FUNCTIONAL ASSESSMENT
PAIN_FUNCTIONAL_ASSESSMENT: 0-10
PAIN_FUNCTIONAL_ASSESSMENT: 0-10

## 2023-01-07 NOTE — PROGRESS NOTES
Pt seen and examined at bedside, nurse notified that pt abdomen clinically worse. Stat KUB obtained, shows increased gas in stomach otherwise no gross evidence of free air. Pt reports worsened abdominal pain since prior exam ~6hrs ago, he has had a tray of clear liquids without increase in nausea or new onset fever. Discussed with patient, likely worsening of known complicated appendicitis, pt is agreeable to proceed to OR for emergent appendectomy. The risks discussed include bleeding, infection, scarring, damage to surrounding tissues, risk of open procedure, risk of bowel resection and possible ostomy, need for further surgery. All questions were answered. Wife is also present at bedside, she is agreeable to this plan.     Electronically signed by Tyesha Loza DO on 1/7/2023 at 4:30 PM

## 2023-01-07 NOTE — CONSULTS
[address]      Patient's Name/ Date of Birth/ Gender: Michelle Buckner. Belen Martell / 1980 (38 y.o.) / male     Referring Physician: Jovanni Ibanez DO    Consulting Physician: Dr. Minerva Cabezas 81     CC: complicated appendicitis    History of present Illness: Michelle Martell is a 38 y.o. male, presented to ED last night with complaint of 3d history of lower abdominal pain. CT abd/pelv was obtained significant for appendicitis with adjacent abscess formation. Pt reports feeling better since presentation secondary to pain medication, otherwise denies other symptoms. Past Medical History:  has a past medical history of ADHD, Anxiety, Depression, and Tourette's. Past Surgical History:   Past Surgical History:   Procedure Laterality Date    CYST REMOVAL      From the nasal septum       Social History:  reports that he has never smoked. He has never used smokeless tobacco. He reports current alcohol use. He reports that he does not use drugs. Family History: family history includes Cancer in his father and mother; Coronary Art Dis in his father and another family member; Schizophrenia in an other family member. Review of Systems:   General: Denies fever, chills, night sweats, weight loss, malaise, fatigue  HEENT: Denies sore throat, sinus problems, allergic rhinosinusitis  Card: Denies chest pain, palpitations, orthopnea/PND. Denies h/o murmurs  Pulm: Denies cough, shortness of breath, WYNN  GI:  lower abdomen and RLQ pain  : Denies polyuria, dysuria, hematuria  Endo: Denies diabetes, thyroid problems. Heme: Denies anemia, h/o bleeding or clotting problems. Neuro: Denies h/o CVA, TIA  Skin: Denies rashes, ulcers  Musculoskeletal: Denies muscle, joint, back pain. Allergies: Patient has no known allergies.     Current Meds:  Current Facility-Administered Medications:     HYDROmorphone (DILAUDID) injection 1 mg, 1 mg, IntraVENous, Q4H PRN, VONNIE Boone - CNP, 1 mg at 01/07/23 0549    Current Outpatient Medications:     Lisdexamfetamine Dimesylate (VYVANSE) 60 MG CAPS, Take 60 mg by mouth daily. , Disp: , Rfl:     atorvastatin (LIPITOR) 10 MG tablet, Take 10 mg by mouth daily, Disp: , Rfl:     sertraline (ZOLOFT) 50 MG tablet, Take 50 mg by mouth daily, Disp: , Rfl:     haloperidol (HALDOL) 1 MG tablet, Take 1 mg by mouth 2 times daily Medication verified @ [facility] on 12/29/17., Disp: , Rfl:     montelukast (SINGULAIR) 10 MG tablet, Take 10 mg by mouth nightly Medication verified @ [facility] on 12/29/17., Disp: , Rfl:     Vital Signs:  Vitals:    01/07/23 0700   BP: 115/75   Pulse:    Resp:    Temp:    SpO2: 95%       Physical Exam:  Vital signs and Nurse's note reviewed    General Appearance:  awake, alert, no acute distress, well developed, well nourished   Skin:  Skin color, texture, turgor normal. No rashes or lesions. Head/face:  NCAT, face symmetrical  Eyes:  PERRL, no evidence of conjunctivitis or ptosis bilaterally  Ears:  External ears and canals grossly normal, no evidence of otorrhea. Nose/Sinuses:  Nares normal. Septum midline. Mucosa normal. No external drainage noted. Mouth/Neck:  Mucosa moist.  No external oral lesions. Trachea midline. No visible masses. Lungs:  Normal chest expansion, unlabored breathing without accessory muscle use. No audible rales, rhonchi, or wheezing. Cardiovascular: S1S2. No evidence of JVD. No evidence of pulsatile masses in abdomen  Abdomen:  Soft, tender lower abdomen with positive Rovsing sign and tenderness at McBurney point. Musculoskeletal: No evidence of bony/muscular deformities, trauma, atrophy of either left/right upper/lower extremity. No evidence of digital clubbing or cyanosis. Neurologic:  CN 2-12 grossly intact without obvious deficits. Grossly normal sensation in all extremities.   Psychiatric: appropriate judgement and insight, appropriate recall of recent and remote memory, no evidence of depression/anxiety/agitation    Labs:   CBC:   Recent Labs     01/07/23 0202   WBC 15.5*   HGB 16.5        BMP:    Recent Labs     01/07/23 0202      K 4.0      CO2 24   BUN 15   CREATININE 0.90   GLUCOSE 144*     Hepatic:   Recent Labs     01/07/23 0202   AST 19   ALT 28   ALKPHOS 69   BILITOT 0.4   LIPASE 21     Coagulation:   Recent Labs     01/07/23 0202   PROT 6.4         Imaging:  CT ABDOMEN PELVIS WO CONTRAST Additional Contrast? None    Result Date: 1/7/2023  EXAMINATION: CT OF THE ABDOMEN AND PELVIS WITHOUT CONTRAST 1/7/2023 2:01 am TECHNIQUE: CT of the abdomen and pelvis was performed without the administration of intravenous contrast. Multiplanar reformatted images are provided for review. Automated exposure control, iterative reconstruction, and/or weight based adjustment of the mA/kV was utilized to reduce the radiation dose to as low as reasonably achievable. COMPARISON: None. HISTORY: ORDERING SYSTEM PROVIDED HISTORY: lower abd pain 3 days worse tonight TECHNOLOGIST PROVIDED HISTORY: lower abd pain 3 days worse tonight Decision Support Exception - unselect if not a suspected or confirmed emergency medical condition->Emergency Medical Condition (MA) Reason for Exam: lower abd pain 3 days worse tonight FINDINGS: Lower Chest:  Visualized portion of the lower chest demonstrates no acute abnormality. Organs: The liver, gallbladder, pancreas, spleen, adrenal glands, kidneys and visualized ureters are unremarkable. GI/Bowel: Stomach and duodenal sweep demonstrate no acute abnormality. There is no evidence of bowel obstruction. No evidence of abnormal bowel wall thickening or distension. The appendix is identified. It contains 2 large appendicoliths, is fluid-filled and measures 1.5 cm diameter. There is surrounding inflammatory change and possible abscess adjacent the terminus measuring 2.6 x 2.0 x 1.5 cm. Pelvis: The bladder and reproductive organs are unremarkable. Peritoneum/Retroperitoneum: No free fluid or free air. Mild mesenteric adenopathy associated with the above-described appendicitis. Bones/Soft Tissues: No acute bone or soft tissue abnormality evident. Marked acute appendicitis with possible 2.6 x 2.0 x 1.5 cm abscess at the terminus. No free fluid or free air evident. US SCROTUM W LIMITED DUPLEX    Result Date: 1/7/2023  EXAMINATION: ULTRASOUND OF THE SCROTUM/TESTICLES WITH COLOR DOPPLER FLOW EVALUATION 1/7/2023 TECHNIQUE: Duplex ultrasound using B-mode/gray scaled imaging, Doppler spectral analysis and color flow Doppler was obtained of the testicles. COMPARISON: CT abdomen and pelvis today HISTORY: ORDERING SYSTEM PROVIDED HISTORY: testicle pain TECHNOLOGIST PROVIDED HISTORY: testicle pain FINDINGS: Measurements: Right Testicle: 4.1 x 2.8 x 1.8 cm Left Testicle: 4.1 x 2.6 x 2.1 cm Right: Grey Scale: The right testicle demonstrates normal homogeneous echotexture without focal lesion. No evidence of testicular microlithiasis. Doppler Evaluation: There is normal arterial and venous Doppler flow within the testicle. Scrotal Sac: No evidence of hydrocele. Epididymis: No acute abnormality. 3 mm cyst. Left: Grey Scale: The left testicle demonstrates normal homogeneous echotexture without focal lesion. No evidence of testicular microlithiasis. Doppler Evaluation: There is normal arterial and venous Doppler flow within the testicle. Scrotal Sac: No evidence of hydrocele. Epididymis: No acute abnormality. 9 mm cyst.     1.  No acute abnormality identified. 2.  Incidental bilateral epididymal head cysts. Assessment:    Arabella Fowler  Dary Rubi is a 38 y.o. male with     Acute appendicitis secondary to appendicolith x2 and complicated by adjacent abscess    Plan:    We discussed treatment plan, recommend medical management with antibiotics and pain control with plan for interval appendectomy in 6 weeks in order to avoid need for colectomy and possible ostomy creation, pt is agreeable to this. OK for CLD for now, ok to advance if pt tolerating without increase in abdominal pain  We discussed the possibility of urgent/emergent surgery if pt symptoms worsen despite antibiotic therapy. All questions were answered, pt is agreeable to this plan.         Asha Iglesias,   1/7/2023

## 2023-01-07 NOTE — ANESTHESIA PRE PROCEDURE
Department of Anesthesiology  Preprocedure Note       Name:  Zenobia Deleon   Age:  38 y.o.  :  1980                                          MRN:  [id number]         Date:  2023      Surgeon: Karen Quiroga):  Nadege Alamo,     Procedure: Procedure(s):  APPENDECTOMY LAPAROSCOPIC ROBOTIC  possible BOWEL RESECTION HEMICOLECTOMY LAPAROSCOPIC ROBOTIC    Medications prior to admission:   Prior to Admission medications    Medication Sig Start Date End Date Taking? Authorizing Provider   Lisdexamfetamine Dimesylate (VYVANSE) 60 MG CAPS Take 60 mg by mouth daily. 22  Yes Historical Provider, MD   atorvastatin (LIPITOR) 10 MG tablet Take 10 mg by mouth daily 22  Yes Historical Provider, MD   sertraline (ZOLOFT) 50 MG tablet Take 50 mg by mouth daily    Historical Provider, MD   haloperidol (HALDOL) 1 MG tablet Take 1 mg by mouth 2 times daily Medication verified @ [facility] on 17. Historical Provider, MD   montelukast (SINGULAIR) 10 MG tablet Take 10 mg by mouth nightly Medication verified @ [facility] on 17.     Historical Provider, MD       Current medications:    Current Facility-Administered Medications   Medication Dose Route Frequency Provider Last Rate Last Admin    sodium chloride flush 0.9 % injection 5-40 mL  5-40 mL IntraVENous 2 times per day Marcella [place], APRN - CNP   10 mL at 23 1210    sodium chloride flush 0.9 % injection 10 mL  10 mL IntraVENous PRN Marcella [place], APRN - CNP        0.9 % sodium chloride infusion   IntraVENous PRN Marcella [place], APRN - CNP        potassium chloride (KLOR-CON M) extended release tablet 40 mEq  40 mEq Oral PRN Marcella [place], APRN - CNP        Or    potassium bicarb-citric acid (EFFER-K) effervescent tablet 40 mEq  40 mEq Oral PRN Marcella [place], APRN - CNP        Or    potassium chloride 10 mEq/100 mL IVPB (Peripheral Line)  10 mEq IntraVENous PRN Marcella [place], APRN - CNP        magnesium sulfate 1000 mg in dextrose 5% 100 mL IVPB  1,000 mg IntraVENous PRN Marci Juan, APRN - CNP        ondansetron (ZOFRAN-ODT) disintegrating tablet 4 mg  4 mg Oral Q8H PRN Marci Juan, APRN - CNP        Or    ondansetron [facility]) injection 4 mg  4 mg IntraVENous Q6H PRN [place] Juan, APRN - CNP        polyethylene glycol (GLYCOLAX) packet 17 g  17 g Oral Daily PRN Marci Juan, APRN - CNP        acetaminophen (TYLENOL) tablet 650 mg  650 mg Oral Q6H PRN Marci Juan, APRN - CNP        Or    acetaminophen (TYLENOL) suppository 650 mg  650 mg Rectal Q6H PRN [place] Juan, APRN - CNP        piperacillin-tazobactam (ZOSYN) 3,375 mg in dextrose 5 % 50 mL IVPB extended infusion (mini-bag)  3,375 mg IntraVENous q8h [place] Juan, APRN - CNP   Stopped at 01/07/23 1627    HYDROmorphone (DILAUDID) injection 1 mg  1 mg IntraVENous Q4H PRN Marci Juan, APRN - CNP   1 mg at 01/07/23 1530    atorvastatin (LIPITOR) tablet 10 mg  10 mg Oral Daily Leva Skeeters, DO   10 mg at 01/07/23 1312    [Held by provider] haloperidol (HALDOL) tablet 1 mg (Patient Supplied)  1 mg Oral BID Leva Skeeters, DO        [Held by provider] Lisdexamfetamine Dimesylate CAPS 60 mg (Patient Supplied)  60 mg Oral Daily Leva Skeeters, DO        montelukast (SINGULAIR) tablet 10 mg  10 mg Oral Nightly Leva Skeeters, DO        sertraline (ZOLOFT) tablet 50 mg  50 mg Oral Daily Kamlesh Anders DO   50 mg at 01/07/23 1312    0.9 % sodium chloride infusion   IntraVENous Continuous Leva Skeeters, DO 75 mL/hr at 01/07/23 1211 New Bag at 01/07/23 1211     Facility-Administered Medications Ordered in Other Encounters   Medication Dose Route Frequency Provider Last Rate Last Admin    lidocaine PF 1 % injection   IntraVENous PRN Melly Veliz MD   50 mg at 01/07/23 1836    fentaNYL (SUBLIMAZE) injection   IntraVENous PRN Melly Veliz MD   100 mcg at 01/07/23 1814    midazolam (VERSED) injection   IntraVENous PRN Melly Veliz MD   2 mg at 01/07/23 5509    propofol injection   IntraVENous PRN Chanda Harris MD   160 mg at 01/07/23 1815    rocuronium (Maretta Junes) injection   IntraVENous PRN Chanda Harris MD   50 mg at 01/07/23 1815    dexamethasone (DECADRON) injection   IntraVENous PRN Chanda Harris MD   10 mg at 01/07/23 1824       Allergies:  No Known Allergies    Problem List:    Patient Active Problem List   Diagnosis Code    Bipolar 1 disorder [facility] F31.9    Appendiceal abscess K35.33    Acute appendicitis with generalized peritonitis and abscess, without gangrene K35.21       Past Medical History:        Diagnosis Date    ADHD     Anxiety     Depression     Tourette's        Past Surgical History:        Procedure Laterality Date    CYST REMOVAL      From the nasal septum       Social History:    Social History     Tobacco Use    Smoking status: Never    Smokeless tobacco: Never   Substance Use Topics    Alcohol use: Yes     Comment: socially                                Counseling given: Not Answered      Vital Signs (Current):   Vitals:    01/07/23 1530 01/07/23 1540 01/07/23 1600 01/07/23 1728   BP:  120/82  128/85   Pulse:  (!) 106  (!) 101   Resp: 18 16 19 20   Temp:  99 °F (37.2 °C)  97.7 °F (36.5 °C)   TempSrc:  Oral     SpO2:  96%  97%   Weight:       Height:                                                  BP Readings from Last 3 Encounters:   01/07/23 128/85   06/13/21 (!) 147/97   10/08/18 (!) 145/95       NPO Status: Time of last liquid consumption: 1500                        Time of last solid consumption: 2000                        Date of last liquid consumption: 01/07/23                        Date of last solid food consumption: 01/06/23    BMI:   Wt Readings from Last 3 Encounters:   01/07/23 165 lb (74.8 kg)   06/13/21 160 lb (72.6 kg)   10/08/18 168 lb (76.2 kg)     Body mass index is 23.68 kg/m².     CBC:   Lab Results   Component Value Date/Time    WBC 15.5 01/07/2023 02:02 AM    RBC 5.49 01/07/2023 02:02 AM    RBC 5.04 03/29/2012 11:28 AM    HGB 16.5 01/07/2023 02:02 AM    HCT 46.4 01/07/2023 02:02 AM    MCV 84.5 01/07/2023 02:02 AM    RDW 13.0 01/07/2023 02:02 AM     01/07/2023 02:02 AM     03/29/2012 11:28 AM       CMP:   Lab Results   Component Value Date/Time     01/07/2023 02:02 AM    K 4.0 01/07/2023 02:02 AM     01/07/2023 02:02 AM    CO2 24 01/07/2023 02:02 AM    BUN 15 01/07/2023 02:02 AM    CREATININE 0.90 01/07/2023 02:02 AM    GFRAA >60 06/13/2021 10:47 PM    LABGLOM >60 01/07/2023 02:02 AM    GLUCOSE 144 01/07/2023 02:02 AM    GLUCOSE 97 03/29/2012 11:28 AM    PROT 6.4 01/07/2023 02:02 AM    CALCIUM 9.0 01/07/2023 02:02 AM    BILITOT 0.4 01/07/2023 02:02 AM    ALKPHOS 69 01/07/2023 02:02 AM    AST 19 01/07/2023 02:02 AM    ALT 28 01/07/2023 02:02 AM       POC Tests: No results for input(s): POCGLU, POCNA, POCK, POCCL, POCBUN, POCHEMO, POCHCT in the last 72 hours. Coags: No results found for: PROTIME, INR, APTT    HCG (If Applicable): No results found for: PREGTESTUR, PREGSERUM, HCG, HCGQUANT     ABGs: No results found for: PHART, PO2ART, RFY3RRC, CPE6UBK, BEART, Y2KBSOLQ     Type & Screen (If Applicable):  No results found for: LABABO, LABRH    Drug/Infectious Status (If Applicable):  Lab Results   Component Value Date/Time    HEPCAB NONREACTIVE 07/19/2018 08:18 AM       COVID-19 Screening (If Applicable):   Lab Results   Component Value Date/Time    COVID19 NEGATIVE 10/11/2020 08:59 AM           Anesthesia Evaluation  Patient summary reviewed and Nursing notes reviewed  Airway: Mallampati: II          Dental: normal exam         Pulmonary:Negative Pulmonary ROS and normal exam                               Cardiovascular:  Exercise tolerance: good (>4 METS),                     Neuro/Psych:   (+) psychiatric history: stable with treatment            GI/Hepatic/Renal: Neg GI/Hepatic/Renal ROS            Endo/Other:                     Abdominal:             Vascular:           Other Findings:           Anesthesia Plan      general     ASA 2       Induction: intravenous. MIPS: Postoperative opioids intended and Postoperative trial extubation. Anesthetic plan and risks discussed with patient. Use of blood products discussed with patient whom consented to blood products. Plan discussed with CRNA.                     Mini Zepeda MD   1/7/2023

## 2023-01-07 NOTE — ED NOTES
[employer] tech, Suzan Burns, called and notified of US order     Olivia Serum, AMRIT  01/07/23 8982

## 2023-01-07 NOTE — H&P
[facility]  Office: [address], DO, Kalen Davis, DO, Ely Willett, DO, Omari Solares Blood, DO, Pedro Canales MD, Elvira Sesay MD, Manuel Nava MD, Jessica Wolf MD,  Mariajose Ramirez MD, Giovany Moore MD, Yomaira Almeida DO, Joelle Castañeda MD,  Neeta Rivas MD, Josué Candelario MD, John Wilson DO, Sue Cabezas MD, Graciela Solares MD, Kat Arias DO, Stefano Correa MD, Berna Bob MD, Robert Michael MD, Nghia Ojeda MD, Luna Dominguez DO, Cassi Alexander MD, Po Arreguin MD, Manjeet Aguiar, [facility],  Lin Paulino, CNP, Adalberto Bauman, CNP, Lulu Villar, CNP,  Franki Lundborg, [facility], Sully Trinh, CNP, Demarcus Zamorano, CNP, Estefany Verma, CNP, Ginger Massey, CNP, Jacqueline Kim, CNP, Jenni Lindo PA-C, Andrea Espino, CNS, Zach Key, CNP, Huyen Esposito, [facility]   [address] [facility]    HISTORY AND PHYSICAL EXAMINATION            Date:   1/7/2023  Patient name:  Sachin Berger  Date of admission:  1/7/2023  1:52 AM  MRN:   [id number]  Account:  [de-identified]  YOB: 1980  PCP:    Chris Cervantes MD  Room:   [facility]339-  Code Status:    Full Code    Chief Complaint:     Chief Complaint   Patient presents with    Abdominal Pain     Patient presents the hospital with a 3-day course of abdominal pain, patient states \"my belly started hurting on Wednesday\"    History Obtained From:     patient, electronic medical record    History of Present Illness:     Sachin Berger is a 38 y.o. Non- / non  male who presents with Abdominal Pain   and is admitted to the hospital for the management of Appendiceal abscess. This very pleasant 46-year-old male presents the hospital due to abdominal pain. He states that he originally noticed some abdominal pain starting Wednesday. It was intermittent and was not that bad.   He noticed it again on Thursday however did not seek medical attention. Yesterday it acutely worsened and he describes it as almost feeling like a knife in his belly. He presents to the hospital where he is been found to have acute appendicitis with perforation and abscess formation. There is a 2.6 x 2.0 x 1.5 cm abscess. The patient has been admitted and initiated on Zosyn. General surgery has been contacted and at this point in time recommendations are to allow the belly to cool off with antibiotics with interval appendectomy in approximately 6 weeks. Patient does understand that if he acutely worsens he may have to go to the operating room and he may ultimately require an ostomy. He understands that antibiotics and conservative management at this point in time can help to prevent ostomy creation. Multiple questions answered and he denies any concerns at this point in time. Pain is under reasonable control. Past Medical History:     Past Medical History:   Diagnosis Date    ADHD     Anxiety     Depression     Tourette's         Past Surgical History:     Past Surgical History:   Procedure Laterality Date    CYST REMOVAL      From the nasal septum        Medications Prior to Admission:     Prior to Admission medications    Medication Sig Start Date End Date Taking? Authorizing Provider   Lisdexamfetamine Dimesylate (VYVANSE) 60 MG CAPS Take 60 mg by mouth daily. 7/7/22  Yes Historical Provider, MD   atorvastatin (LIPITOR) 10 MG tablet Take 10 mg by mouth daily 8/8/22  Yes Historical Provider, MD   sertraline (ZOLOFT) 50 MG tablet Take 50 mg by mouth daily    Historical Provider, MD   haloperidol (HALDOL) 1 MG tablet Take 1 mg by mouth 2 times daily Medication verified @ [facility] on 12/29/17. Historical Provider, MD   montelukast (SINGULAIR) 10 MG tablet Take 10 mg by mouth nightly Medication verified @ [facility] on 12/29/17. Historical Provider, MD        Allergies:     Patient has no known allergies.     Social History:     Tobacco:    reports that he has never smoked. He has never used smokeless tobacco.  Alcohol:      reports current alcohol use.  Drug Use:  reports no history of drug use.    Family History:     Family History   Problem Relation Age of Onset    Cancer Mother         Breasts    Coronary Art Dis Father     Cancer Father         Skin    Schizophrenia Other         Schizoaffective, cousin    Coronary Art Dis Other         Grandparent       Review of Systems:     Positive and Negative as described in HPI.    CONSTITUTIONAL:  negative for fevers, chills, sweats, fatigue, weight loss  HEENT:  negative for vision, hearing changes, runny nose, throat pain  RESPIRATORY:  negative for shortness of breath, cough, congestion, wheezing  CARDIOVASCULAR:  negative for chest pain, palpitations  GASTROINTESTINAL:  negative for nausea, vomiting, diarrhea, constipation, change in bowel habits.  Patient reports intermittent abdominal pain that started on Wednesday that has progressively worsened  GENITOURINARY:  negative for difficulty of urination, burning with urination, frequency   INTEGUMENT:  negative for rash, skin lesions, easy bruising   HEMATOLOGIC/LYMPHATIC:  negative for swelling/edema   ALLERGIC/IMMUNOLOGIC:  negative for urticaria , itching  ENDOCRINE:  negative increase in drinking, increase in urination, hot or cold intolerance  MUSCULOSKELETAL:  negative joint pains, muscle aches, swelling of joints  NEUROLOGICAL:  negative for headaches, dizziness, lightheadedness, numbness, pain, tingling extremities  BEHAVIOR/PSYCH:  negative for depression, anxiety    Physical Exam:   /70   Pulse 96   Temp 99.5 °F (37.5 °C) (Oral)   Resp 14   Ht 5' 10\" (1.778 m)   Wt 165 lb (74.8 kg)   SpO2 98%   BMI 23.68 kg/m²   Temp (24hrs), Av.7 °F (37.1 °C), Min:97.8 °F (36.6 °C), Max:99.5 °F (37.5 °C)    No results for input(s): POCGLU in the last 72 hours.    Intake/Output Summary (Last 24 hours) at 2023 1131  Last data filed at 2023 0504  Gross per  24 hour   Intake 1099 ml   Output --   Net 1099 ml       General Appearance:  alert, well appearing, and in no acute distress  Mental status: oriented to person, place, and time  Head:  normocephalic, atraumatic  Eye: no icterus, redness, pupils equal and reactive, extraocular eye movements intact, conjunctiva clear  Ear: normal external ear, no discharge, hearing intact  Nose:  no drainage noted  Mouth: mucous membranes moist  Neck: supple, no carotid bruits, thyroid not palpable  Lungs: Bilateral equal air entry, clear to ausculation, no wheezing, rales or rhonchi, normal effort  Cardiovascular: normal rate, regular rhythm, no murmur, gallop, rub  Abdomen: Soft, diffuse tenderness however exquisitely tender over the right lower quadrant, nondistended, normal bowel sounds, no hepatomegaly or splenomegaly  Neurologic: There are no new focal motor or sensory deficits, normal muscle tone and bulk, no abnormal sensation, normal speech, cranial nerves II through XII grossly intact  Skin: No gross lesions, rashes, bruising or bleeding on exposed skin area  Extremities:  peripheral pulses palpable, no pedal edema or calf pain with palpation  Psych: normal affect     Investigations:      Laboratory Testing:  Recent Results (from the past 24 hour(s))   Lipase    Collection Time: 01/07/23  2:02 AM   Result Value Ref Range    Lipase 21 13 - 60 U/L   Magnesium    Collection Time: 01/07/23  2:02 AM   Result Value Ref Range    Magnesium 2.6 1.6 - 2.6 mg/dL   CBC with Auto Differential    Collection Time: 01/07/23  2:02 AM   Result Value Ref Range    WBC 15.5 (H) 3.5 - 11.0 k/uL    RBC 5.49 4.5 - 5.9 m/uL    Hemoglobin 16.5 13.5 - 17.5 g/dL    Hematocrit 46.4 41 - 53 %    MCV 84.5 80 - 100 fL    MCH 30.1 26 - 34 pg    MCHC 35.6 31 - 37 g/dL    RDW 13.0 12.5 - 15.4 %    Platelets 921 479 - 054 k/uL    MPV 9.0 8.0 - 14.0 fL    Seg Neutrophils 59 36 - 66 %    Lymphocytes 28 24 - 44 %    Monocytes 10 (H) 1 - 7 %    Eosinophils % 3 1 - 4 %    Basophils 0 0 - 2 %    Segs Absolute 9.14 (H) 1.8 - 7.7 k/uL    Absolute Lymph # 4.34 1.0 - 4.8 k/uL    Absolute Mono # 1.55 (H) 0.1 - 0.8 k/uL    Absolute Eos # 0.47 (H) 0.0 - 0.4 k/uL    Basophils Absolute 0.00 0.0 - 0.2 k/uL    Morphology Normal    Comprehensive Metabolic Panel    Collection Time: 01/07/23  2:02 AM   Result Value Ref Range    Glucose 144 (H) 70 - 99 mg/dL    BUN 15 6 - 20 mg/dL    Creatinine 0.90 0.70 - 1.20 mg/dL    Est, Glom Filt Rate >60 >60 mL/min/1.73m2    Calcium 9.0 8.6 - 10.4 mg/dL    Sodium 143 135 - 144 mmol/L    Potassium 4.0 3.7 - 5.3 mmol/L    Chloride 106 98 - 107 mmol/L    CO2 24 20 - 31 mmol/L    Anion Gap 13 9 - 17 mmol/L    Alkaline Phosphatase 69 40 - 129 U/L    ALT 28 5 - 41 U/L    AST 19 <40 U/L    Total Bilirubin 0.4 0.3 - 1.2 mg/dL    Total Protein 6.4 6.4 - 8.3 g/dL    Albumin 4.3 3.5 - 5.2 g/dL    Albumin/Globulin Ratio 2.0 1.0 - 2.5   Urinalysis with Reflex to Culture    Collection Time: 01/07/23 10:48 AM    Specimen: Urine, clean catch   Result Value Ref Range    Color, UA Yellow Yellow    Turbidity UA Clear Clear    Glucose, Ur NEGATIVE NEGATIVE    Bilirubin Urine NEGATIVE NEGATIVE    Ketones, Urine NEGATIVE NEGATIVE    Specific Gravity, UA 1.025 1.005 - 1.030    Urine Hgb NEGATIVE NEGATIVE    pH, UA 5.5 5.0 - 8.0    Protein, UA NEGATIVE NEGATIVE    Urobilinogen, Urine Normal Normal    Nitrite, Urine NEGATIVE NEGATIVE    Leukocyte Esterase, Urine NEGATIVE NEGATIVE    Urinalysis Comments       Microscopic exam not performed based on chemical results unless requested in original order. Urinalysis Comments          Urinalysis Comments       Utilizing a urinalysis as the only screening method to exclude a potential uropathogen can be unreliable in many patient populations. Rapid screening tests are less sensitive than culture and if UTI is a clinical possibility, culture should be considered despite a negative urinalysis.          Imaging/Diagnostics:  CT ABDOMEN PELVIS WO CONTRAST Additional Contrast? None    Result Date: 1/7/2023  Marked acute appendicitis with possible 2.6 x 2.0 x 1.5 cm abscess at the terminus. No free fluid or free air evident. US SCROTUM W LIMITED DUPLEX    Result Date: 1/7/2023  1. No acute abnormality identified. 2.  Incidental bilateral epididymal head cysts. Assessment :      Hospital Problems             Last Modified POA    * (Principal) Appendiceal abscess 1/7/2023 Yes       Plan:     Patient status inpatient in the Med/Surge    Acute appendicitis with perforation and abscess formation  Continue Zosyn  Dilaudid for pain control  Appreciate surgery input  Clear liquid diet for now  Dyslipidemia  Continue Lipitor 10 mg  Tourette's  Continue Haldol  Depression  Continue Vyvanse and Zoloft    Consultations:   IP CONSULT TO GENERAL SURGERY     Patient is admitted as inpatient status because of co-morbidities listed above, severity of signs and symptoms as outlined, requirement for current medical therapies and most importantly because of direct risk to patient if care not provided in a hospital setting. Expected length of stay > 48 hours.     Vince Parish DO  1/7/2023  11:31 AM    Copy sent to Dr. Bobby Aguilar MD

## 2023-01-07 NOTE — ED PROVIDER NOTES
[facility] Emergency Department  [id number] [address] [facility] [address] [address]  Phone: [phone]  Fax: Lelia Faye 1972 Xj Name: Keyon Fortune  MRN: [id number]  Birthdate 1980  Date of evaluation: 1/7/2023      CHIEF COMPLAINT       Chief Complaint   Patient presents with    Abdominal Pain       HISTORY OF PRESENT ILLNESS       Chitra ACKERMANBetty Fortune is a 38 y.o. male who presents with lower abdominal pain mostly centrally located. States it began about 3 days ago but is worsened tonight and woke him up from sleeping about an hour prior to arrival.  He is in excruciating pain. Denies any  or GI's history. No dysuria. No fevers. Minimal nausea. No vomiting. No diarrhea or bowel symptoms. No recent trauma. He does report the pain radiates into his testicles. No other symptoms or concerns at this time. REVIEW OF SYSTEMS       Review of Systems   Constitutional:  Negative for chills and fever. HENT:  Negative for sore throat. Respiratory:  Negative for shortness of breath. Cardiovascular:  Negative for chest pain. Gastrointestinal:  Positive for abdominal pain and nausea. Negative for diarrhea and vomiting. Genitourinary:  Negative for flank pain, penile swelling and scrotal swelling. Musculoskeletal:  Negative for neck pain. Skin:  Negative for rash. Neurological:  Negative for weakness and numbness. PAST MEDICAL HISTORY    has a past medical history of ADHD, Anxiety, Depression, and Tourette's. SURGICAL HISTORY      has a past surgical history that includes cyst removal.    CURRENT MEDICATIONS       Current Discharge Medication List        CONTINUE these medications which have NOT CHANGED    Details   Lisdexamfetamine Dimesylate (VYVANSE) 60 MG CAPS Take 60 mg by mouth daily.       atorvastatin (LIPITOR) 10 MG tablet Take 10 mg by mouth daily      sertraline (ZOLOFT) 50 MG tablet Take 50 mg by mouth daily haloperidol (HALDOL) 1 MG tablet Take 1 mg by mouth 2 times daily Medication verified @ [facility] on 12/29/17. montelukast (SINGULAIR) 10 MG tablet Take 10 mg by mouth nightly Medication verified @ [facility] on 12/29/17. ALLERGIES     has No Known Allergies. FAMILY HISTORY     He indicated that the status of his mother is unknown. He indicated that the status of his father is unknown. He indicated that the status of his other is unknown.     family history includes Cancer in his father and mother; Coronary Art Dis in his father and another family member; Schizophrenia in an other family member. SOCIAL HISTORY      reports that he has never smoked. He has never used smokeless tobacco. He reports current alcohol use. He reports that he does not use drugs. PHYSICAL EXAM     INITIAL VITALS:  height is 5' 10\" (1.778 m) and weight is 74.8 kg (165 lb). His temperature is 97.8 °F (36.6 °C). His blood pressure is 124/84 and his pulse is 66. His respiration is 20 and oxygen saturation is 98%. Physical Exam  Constitutional:       General: He is not in acute distress. Appearance: He is well-developed. HENT:      Head: Normocephalic and atraumatic. Right Ear: External ear normal.      Left Ear: External ear normal.   Eyes:      General: Lids are normal.         Right eye: No discharge. Left eye: No discharge. Neck:      Trachea: No tracheal deviation. Cardiovascular:      Rate and Rhythm: Normal rate and regular rhythm. Pulmonary:      Effort: Pulmonary effort is normal.      Breath sounds: Normal breath sounds. Abdominal:      Palpations: Abdomen is soft. Tenderness: There is abdominal tenderness in the suprapubic area. Comments: Mild suprapubic tenderness. Otherwise benign abdominal exam.   Genitourinary:     Comments: Patient does have some mild scrotal tenderness around the epididymis bilaterally.   Otherwise unremarkable scrotal exam.  No erythema, edema or abnormal other findings to the scrotum. Penis is within normal limits. Normal inguinal pulses. No inguinal lymphadenopathy. No evidence of torsion clinically. Skin:     General: Skin is warm and dry. Neurological:      Mental Status: He is alert. GCS: GCS eye subscore is 4. GCS verbal subscore is 5. GCS motor subscore is 6. Psychiatric:         Behavior: Behavior normal.         DIFFERENTIAL DIAGNOSIS/ MDM:     Plan at this time be to initiate further GI/ work-up. Vital signs are normal.  He is writhing in pain. Toradol ordered immediately. He is declining Zofran at this time    DIAGNOSTIC RESULTS     EKG: All EKG's are interpreted by the Emergency Department Physician who either signs or Co-signs this chart in the absence of a cardiologist.        RADIOLOGY:   Interpretation per the Radiologist below, if available at the time of this note:  [address]   Final Result   1. No acute abnormality identified. 2.  Incidental bilateral epididymal head cysts. CT ABDOMEN PELVIS WO CONTRAST Additional Contrast? None   Final Result   Marked acute appendicitis with possible 2.6 x 2.0 x 1.5 cm abscess at the   terminus. No free fluid or free air evident. No results found.     LABS:  Results for orders placed or performed during the hospital encounter of 01/07/23   Lipase   Result Value Ref Range    Lipase 21 13 - 60 U/L   Magnesium   Result Value Ref Range    Magnesium 2.6 1.6 - 2.6 mg/dL   CBC with Auto Differential   Result Value Ref Range    WBC 15.5 (H) 3.5 - 11.0 k/uL    RBC 5.49 4.5 - 5.9 m/uL    Hemoglobin 16.5 13.5 - 17.5 g/dL    Hematocrit 46.4 41 - 53 %    MCV 84.5 80 - 100 fL    MCH 30.1 26 - 34 pg    MCHC 35.6 31 - 37 g/dL    RDW 13.0 12.5 - 15.4 %    Platelets 191 846 - 557 k/uL    MPV 9.0 8.0 - 14.0 fL    Seg Neutrophils 59 36 - 66 %    Lymphocytes 28 24 - 44 %    Monocytes 10 (H) 1 - 7 %    Eosinophils % 3 1 - 4 %    Basophils 0 0 - 2 %    Segs Absolute 9.14 (H) 1.8 - 7.7 k/uL    Absolute Lymph # 4.34 1.0 - 4.8 k/uL    Absolute Mono # 1.55 (H) 0.1 - 0.8 k/uL    Absolute Eos # 0.47 (H) 0.0 - 0.4 k/uL    Basophils Absolute 0.00 0.0 - 0.2 k/uL    Morphology Normal    Comprehensive Metabolic Panel   Result Value Ref Range    Glucose 144 (H) 70 - 99 mg/dL    BUN 15 6 - 20 mg/dL    Creatinine 0.90 0.70 - 1.20 mg/dL    Est, Glom Filt Rate >60 >60 mL/min/1.73m2    Calcium 9.0 8.6 - 10.4 mg/dL    Sodium 143 135 - 144 mmol/L    Potassium 4.0 3.7 - 5.3 mmol/L    Chloride 106 98 - 107 mmol/L    CO2 24 20 - 31 mmol/L    Anion Gap 13 9 - 17 mmol/L    Alkaline Phosphatase 69 40 - 129 U/L    ALT 28 5 - 41 U/L    AST 19 <40 U/L    Total Bilirubin 0.4 0.3 - 1.2 mg/dL    Total Protein 6.4 6.4 - 8.3 g/dL    Albumin 4.3 3.5 - 5.2 g/dL    Albumin/Globulin Ratio 2.0 1.0 - 2.5       EMERGENCY DEPARTMENT COURSE:     The patient was given the following medications:  Orders Placed This Encounter   Medications    ketorolac (TORADOL) injection 30 mg    ondansetron (ZOFRAN) injection 4 mg    0.9 % sodium chloride bolus    fentaNYL (SUBLIMAZE) injection 50 mcg    piperacillin-tazobactam (ZOSYN) 4,500 mg in dextrose 5 % 100 mL IVPB (mini-bag)     Order Specific Question:   Antimicrobial Indications     Answer:   Intra-Abdominal Infection        Vitals:    Vitals:    01/07/23 0200 01/07/23 0204 01/07/23 0405 01/07/23 0411   BP: (!) 137/94  124/84    Pulse:       Resp:       Temp:       SpO2: 99% 100%  98%   Weight:       Height:         -------------------------  BP: 124/84, Temp: 97.8 °F (36.6 °C), Heart Rate: 66, Resp: 20      Re-evaluation Notes    Patient is feeling much better on reevaluation. Does have leukocytosis. CT shows acute appendicitis with an abscess. No evidence of perforation. I discussed with Dr. Dimitris London on-call for surgery and he requested admission to medicine with medical management. Zosyn started. Patient is declining further analgesics at this time as well.   I spoke with the hospitalist who accepted admission of the patient. No further events throughout the patient stay in the department. CONSULTS:    None    CRITICAL CARE:     None    PROCEDURES:    None    FINAL IMPRESSION      1. Acute appendicitis with generalized peritonitis and abscess, without gangrene, unspecified whether perforation present          DISPOSITION/PLAN   DISPOSITION Admitted 01/07/2023 04:20:53 AM      Condition on Disposition    Improved    PATIENT REFERRED TO:  No follow-up provider specified.     DISCHARGE MEDICATIONS:  Current Discharge Medication List          (Please note that portions of this note were completed with a voice recognition program.  Efforts were made to edit the dictations but occasionally words are mis-transcribed.)    Baylee Macias DO, DO  Attending Emergency Physician       Baylee Macias DO  01/07/23 8705

## 2023-01-08 LAB
HCT VFR BLD CALC: 42.8 % (ref 41–53)
HEMOGLOBIN: 14.1 G/DL (ref 13.5–17.5)
MCH RBC QN AUTO: 28.8 PG (ref 26–34)
MCHC RBC AUTO-ENTMCNC: 32.8 G/DL (ref 31–37)
MCV RBC AUTO: 87.8 FL (ref 80–100)
PDW BLD-RTO: 13.4 % (ref 12.5–15.4)
PLATELET # BLD: 292 K/UL (ref 140–450)
PMV BLD AUTO: 7.2 FL (ref 6–12)
RBC # BLD: 4.88 M/UL (ref 4.5–5.9)
WBC # BLD: 12.1 K/UL (ref 3.5–11)

## 2023-01-08 PROCEDURE — 6360000002 HC RX W HCPCS: Performed by: SURGERY

## 2023-01-08 PROCEDURE — 85027 COMPLETE CBC AUTOMATED: CPT

## 2023-01-08 PROCEDURE — 2580000003 HC RX 258: Performed by: SURGERY

## 2023-01-08 PROCEDURE — 2060000000 HC ICU INTERMEDIATE R&B

## 2023-01-08 PROCEDURE — 6370000000 HC RX 637 (ALT 250 FOR IP): Performed by: NURSE PRACTITIONER

## 2023-01-08 PROCEDURE — 99232 SBSQ HOSP IP/OBS MODERATE 35: CPT | Performed by: HOSPITALIST

## 2023-01-08 PROCEDURE — 6370000000 HC RX 637 (ALT 250 FOR IP): Performed by: SURGERY

## 2023-01-08 PROCEDURE — 36415 COLL VENOUS BLD VENIPUNCTURE: CPT

## 2023-01-08 RX ORDER — OXYCODONE HYDROCHLORIDE AND ACETAMINOPHEN 5; 325 MG/1; MG/1
1 TABLET ORAL EVERY 4 HOURS PRN
Status: DISCONTINUED | OUTPATIENT
Start: 2023-01-08 | End: 2023-01-09 | Stop reason: HOSPADM

## 2023-01-08 RX ADMIN — PIPERACILLIN AND TAZOBACTAM 3375 MG: 3; .375 INJECTION, POWDER, FOR SOLUTION INTRAVENOUS at 19:37

## 2023-01-08 RX ADMIN — OXYCODONE HYDROCHLORIDE AND ACETAMINOPHEN 1 TABLET: 5; 325 TABLET ORAL at 19:35

## 2023-01-08 RX ADMIN — KETOROLAC TROMETHAMINE 30 MG: 30 INJECTION, SOLUTION INTRAMUSCULAR; INTRAVENOUS at 08:10

## 2023-01-08 RX ADMIN — SODIUM CHLORIDE, PRESERVATIVE FREE 10 ML: 5 INJECTION INTRAVENOUS at 08:13

## 2023-01-08 RX ADMIN — SERTRALINE HYDROCHLORIDE 50 MG: 50 TABLET ORAL at 08:10

## 2023-01-08 RX ADMIN — ATORVASTATIN CALCIUM 10 MG: 10 TABLET, FILM COATED ORAL at 08:10

## 2023-01-08 RX ADMIN — HYDROMORPHONE HYDROCHLORIDE 1 MG: 1 INJECTION, SOLUTION INTRAMUSCULAR; INTRAVENOUS; SUBCUTANEOUS at 02:16

## 2023-01-08 RX ADMIN — PIPERACILLIN AND TAZOBACTAM 3375 MG: 3; .375 INJECTION, POWDER, FOR SOLUTION INTRAVENOUS at 13:22

## 2023-01-08 RX ADMIN — MONTELUKAST 10 MG: 10 TABLET, FILM COATED ORAL at 19:35

## 2023-01-08 RX ADMIN — KETOROLAC TROMETHAMINE 30 MG: 30 INJECTION, SOLUTION INTRAMUSCULAR; INTRAVENOUS at 04:05

## 2023-01-08 RX ADMIN — KETOROLAC TROMETHAMINE 30 MG: 30 INJECTION, SOLUTION INTRAMUSCULAR; INTRAVENOUS at 14:59

## 2023-01-08 RX ADMIN — OXYCODONE HYDROCHLORIDE AND ACETAMINOPHEN 1 TABLET: 5; 325 TABLET ORAL at 11:25

## 2023-01-08 RX ADMIN — PIPERACILLIN AND TAZOBACTAM 3375 MG: 3; .375 INJECTION, POWDER, FOR SOLUTION INTRAVENOUS at 04:06

## 2023-01-08 RX ADMIN — SODIUM CHLORIDE, PRESERVATIVE FREE 10 ML: 5 INJECTION INTRAVENOUS at 19:36

## 2023-01-08 RX ADMIN — POLYETHYLENE GLYCOL 3350 17 G: 17 POWDER, FOR SOLUTION ORAL at 08:19

## 2023-01-08 ASSESSMENT — PAIN SCALES - GENERAL
PAINLEVEL_OUTOF10: 7
PAINLEVEL_OUTOF10: 5
PAINLEVEL_OUTOF10: 9
PAINLEVEL_OUTOF10: 7
PAINLEVEL_OUTOF10: 7

## 2023-01-08 ASSESSMENT — PAIN DESCRIPTION - LOCATION: LOCATION: ABDOMEN

## 2023-01-08 NOTE — PROGRESS NOTES
[facility] General Surgery Progress Note            PATIENT NAME: Lety Mao DATE: 2023, 7:25 AM    Chief Complaint   Patient presents with    Abdominal Pain       SUBJECTIVE:    Pt seen and examined. No acute events overnight, denies nausea. OBJECTIVE:   Vitals:  /87   Pulse 72   Temp 97.7 °F (36.5 °C) (Oral)   Resp 16   Ht 5' 10\" (1.778 m)   Wt 171 lb 15.3 oz (78 kg)   SpO2 94%   BMI 24.67 kg/m²    TEMPERATURE:  Current - Temp: 97.7 °F (36.5 °C); Max - Temp  Av.4 °F (36.9 °C)  Min: 97.7 °F (36.5 °C)  Max: 99.5 °F (37.5 °C)    INTAKE/OUTPUT:      Intake/Output Summary (Last 24 hours) at 2023 0725  Last data filed at 2023 0514  Gross per 24 hour   Intake --   Output 1440 ml   Net -1440 ml                 General: AOx3, NAD  Lungs: Symmetrical chest rise bilaterally, no audible wheezes or rhonchi  Heart: S1S2  Abdomen: softly distended, appropriately tender, incisions C/D/I. Extremity: moves all extremities x4, No edema      Data Review:  CBC:   Recent Labs     23   WBC 15.5*   HGB 16.5        BMP:    Recent Labs     23      K 4.0      CO2 24   BUN 15   CREATININE 0.90   GLUCOSE 144*     Hepatic:   Recent Labs     23   AST 19   ALT 28   ALKPHOS 69   BILITOT 0.4     Coagulation:   Recent Labs     23   PROT 6.4             ASSESSMENT     Patient Active Problem List   Diagnosis    Bipolar 1 disorder ([facility] [address]    Appendiceal abscess    Acute appendicitis with generalized peritonitis and abscess, without gangrene   POD 1 robot appendectomy      PLAN    Pt likely has ileus secondary to both appendicitis as well as surgery/anesthesia. Keep on CLD for today, monitor for bowel function, low threshold for NGT placement if pt becomes persistently nauseated despite antinausea medication.    Ambulate, IS while in bed  Continue abx, anticipate 10d course   Supportive care      Electronically signed by Allie Brady Maxime Hart DO on 1/8/2023 at 7:27 AM

## 2023-01-08 NOTE — ANESTHESIA POSTPROCEDURE EVALUATION
Department of Anesthesiology  Postprocedure Note    Patient: Paxton Smith  MRN: [id number]  YOB: 1980  Date of evaluation: 1/7/2023      Procedure Summary     Date: 01/07/23 Room / Location: [address]    Anesthesia Start: 6430 Anesthesia Stop: 1931    Procedure: APPENDECTOMY LAPAROSCOPIC ROBOTIC Diagnosis:       Acute appendicitis with generalized peritonitis and abscess, without gangrene, unspecified whether perforation present      (Acute appendicitis with generalized peritonitis and abscess, without gangrene, unspecified whether perforation present [K35.21])    Surgeons: Lobo Kamara DO Responsible Provider: Nahomy Tripathi MD    Anesthesia Type: general ASA Status: 2          Anesthesia Type: No value filed.     Ghulam Phase I: Ghulam Score: 9    Ghulam Phase II:        Anesthesia Post Evaluation    Patient location during evaluation: PACU  Patient participation: complete - patient participated  Level of consciousness: awake and alert  Airway patency: patent  Nausea & Vomiting: no nausea and no vomiting  Complications: no  Cardiovascular status: blood pressure returned to baseline  Respiratory status: acceptable  Hydration status: euvolemic

## 2023-01-08 NOTE — OP NOTE
Operative Note      Patient: Daniel Mcnally  YOB: 1980  MRN: [id number]    Date of Procedure: 1/7/2023    Pre-Op Diagnosis: Acute appendicitis with generalized peritonitis and abscess, without gangrene, unspecified whether perforation present [K35.21]    Post-Op Diagnosis:   Acute appendicitis, generalized peritonitis and abscess, gangrene present, no gross perforation  Small epigastric hernia containing preperitoneal fat       Procedure(s):  APPENDECTOMY LAPAROSCOPIC ROBOTIC    Surgeon(s):  Gio Moe DO    Assistant:   * No surgical staff found *    Anesthesia: General    Estimated Blood Loss (mL): Minimal    Complications: None    Specimens:   ID Type Source Tests Collected by Time Destination   A : APPENDIX Tissue Appendix SURGICAL PATHOLOGY Gio Moe DO 1/7/2023 1905        Implants:  * No implants in log *      Drains:   [REMOVED] Urinary Catheter 01/07/23 2 Way; Phillips (Removed)       Findings: as above. Wound class 4    Detailed Description of Procedure: Indication: Kathryn Teresa is a 38 y.o. male who presented with the above diagnosis. The risks, benefits, complications and alternatives to surgery were discussed to the patient's understanding, written informed consent was obtained. Operative detail:    DESCRIPTION OF PROCEDURE:  Patient was brought to the operating suite and placed on the operating table in supine position. Timeout was performed verifying correct patient, position, equipment and procedure to be performed. EPC cuffs were applied and preoperative antibiotics were infused. General anesthesia administered and the patient was endotracheally intubated. The patient's abdomen was prepped and draped in the usual sterile fashion.  Scalpel was used to make a stab incision at Hall's point, Veress needle was inserted and pneumoperitoneum established to 15mmHg CO2 after a negative saline drop test.  8mm Optiview trocar was used to gain entry into the abdomen, no injury to the underlying structures were seen. Incision was made in the LLQ for an 8mm port and a vertical midline incision was made over the epigastric hernia where a 12mm port was placed. The patient was then positioned in trendelenburg and rotated to the patient's left. The surgical robot was docked in the usual fashion. The appendix was found to be adherent to the right abdominal wall, there were erythematous changes to the surrounding small bowel, the junction of the appendix and cecum appeared erythematous but soft. Small bowel was swept away from the RLQ the terminal ileum was identified by the [place]. The appendix was grasped at the tip. Vessel sealer was used to ligate the mesoappendix starting at the distal and and moving proximally until the base was reached. DaVinci stapler with white load was inserted into the 12mm working port, placed at the appendiceal base and fired successfully. The adjacent epiploic fat was then used to further cover the stapled stump with 3-0 vicryl sutures. There was no gross spillage. The appendiceal stump was inspected and no bleeding/leakage/purulence was seen. The pelvis was inspected, purulent fluid was seen and this area was irrigated and suctioned with sterile saline until the effluent was clear. Similar fluid was seen in the RUQ which was suctioned away as well. Irrisept solution was then used to irrigate the right abdomen, this was allowed to dwell for several minutes then suctioned out followed by sterile saline irrigation. The cannulas were removed under direct vision with no bleeding. The pneumoperitoneum was released. The port sites were locally anesthetized with a total of 40cc of 1% lidocaine and 0.25% marcaine without epinephrine. The 12mm port site was extended slightly in order to identify the epigastric hernia, the incacerated preperitoneal fat was transected, hemostasis was excellent.  The fascial defect including the port site and hernia were closed with figure of eight 0-vicryl suture. The skin incisions were closed with interrupted 4-0 Monocryl subcuticular sutures. Surgical skin glue was applied. The sponge and needle counts were correct at the end of the case. The patient tolerated the procedure well, was extubated and was sent to PACU in stable condition.       Electronically signed by Ray Ledezam DO on 1/7/2023 at 7:47 PM

## 2023-01-08 NOTE — PROGRESS NOTES
[facility]  Office: [address], DO, Laly Martinez, DO, Suad Esqueda, DO, Elaine Ryan, DO, Leonila Pride MD, Danny Wolfe MD, Jhon Tang MD, Florentino Garcia MD,  Adam Ray MD, Enrique Rodriguez MD, Reji Sibley, DO, Attila Barrett MD,  Jamshid Kearns MD, Rosie Marroquin MD, Inocente Soulier, DO, Jose Alfredo Lares MD, Sarah Quiles MD, Estelle Real, DO, Manjit Luis MD, Bandar Foley MD, Jake Velasco MD, Geovanna Mohamud MD, Tim Cabrera, DO, Krishna Ceron MD, Mini Dexter MD, Ky Danielle, CNP,  Zhou Hernández, CNP, Matt Zimmerman, CNP, Merline Hurd, CNP,  Bonifacio Tesfaye, [facility], Dinorah Hebert, CNP, Mary Daniels, CNP, Stefan Lima, CNP, Jami Calle, CNP, Atif Sam, CNP, Kenny Brandon PA-C, Nya Lentz, [facility], Amari Ibrahim, CNP, Tristen Cummins [id number]    Progress Note    1/8/2023    12:13 PM    Name:   Maria Dolores Tripathi  MRN:     [id number]     Acct:      [de-identified]   Room:   [address] [place] [address] Day:  1  Admit Date:  1/7/2023  1:52 AM    PCP:   Irina Joe MD  Code Status:  Full Code    Subjective:     C/C:   Chief Complaint   Patient presents with    Abdominal Pain     Patient seen in follow-up for abdominal pain secondary to acute appendicitis, patient states \"I am sore\"    Interval History Status: improved. Patient is status post laparoscopic appendectomy. He is postop day #1 and doing reasonably well. He has developed a little bit of a postoperative ileus. He has not had any nausea or vomiting. Had a long discussion with him regarding postoperative ileus and encouraged him to ambulate. Since seeing him early this morning the patient has been ambulating aggressively. I do anticipate that he will recover quickly. Onset of postoperative pain he has no particular complaints at this point in time. Brief History:      This very pleasant 81-year-old male presented to the hospital with a 3-day course of abdominal pain. The patient was found to have acute appendicitis with what appeared to be perforation and abscess formation. He was originally to be treated nonoperatively but developed acute worsening of abdominal pain. He underwent exploration and was able to undergo a robotic assisted laparoscopic appendectomy. He is postop day #1 is doing well with the exception of a mild ileus. Review of Systems:     Constitutional:  negative for chills, fevers, sweats  Respiratory:  negative for cough, dyspnea on exertion, shortness of breath, wheezing  Cardiovascular:  negative for chest pain, chest pressure/discomfort, lower extremity edema, palpitations  Gastrointestinal:  positive for postoperative pain. Patient denies any constipation, diarrhea, nausea, vomiting  Neurological:  negative for dizziness, headache    Medications: Allergies:  No Known Allergies    Current Meds:   Scheduled Meds:    sodium chloride flush  5-40 mL IntraVENous 2 times per day    piperacillin-tazobactam  3,375 mg IntraVENous q8h    atorvastatin  10 mg Oral Daily    [Held by provider] haloperidol  1 mg Oral BID    [Held by provider] Lisdexamfetamine Dimesylate  60 mg Oral Daily    montelukast  10 mg Oral Nightly    sertraline  50 mg Oral Daily    ketorolac  30 mg IntraVENous Q6H     Continuous Infusions:    sodium chloride      sodium chloride 75 mL/hr at 01/07/23 2139     PRN Meds: oxyCODONE-acetaminophen, sodium chloride flush, sodium chloride, potassium chloride **OR** potassium alternative oral replacement **OR** potassium chloride, magnesium sulfate, ondansetron **OR** ondansetron, polyethylene glycol, acetaminophen **OR** [DISCONTINUED] acetaminophen, HYDROmorphone    Data:     Past Medical History:   has a past medical history of ADHD, Anxiety, Depression, and Tourette's. Social History:   reports that he has never smoked.  He has never used smokeless tobacco. He reports current alcohol use. He reports that he does not use drugs. Family History:   Family History   Problem Relation Age of Onset    Cancer Mother         Breasts    Coronary Art Dis Father     Cancer Father         Skin    Schizophrenia Other         Schizoaffective, cousin    Coronary Art Dis Other         Grandparent       Vitals:  BP (!) 124/93   Pulse 58   Temp 97.9 °F (36.6 °C) (Oral)   Resp 16   Ht 5' 10\" (1.778 m)   Wt 171 lb 15.3 oz (78 kg)   SpO2 97%   BMI 24.67 kg/m²   Temp (24hrs), Av.1 °F (36.7 °C), Min:97.7 °F (36.5 °C), Max:99 °F (37.2 °C)    No results for input(s): POCGLU in the last 72 hours. I/O (24Hr): Intake/Output Summary (Last 24 hours) at 2023 1213  Last data filed at 2023 0514  Gross per 24 hour   Intake --   Output 1440 ml   Net -1440 ml       Labs:  Hematology:  Recent Labs     23  0202   WBC 15.5*   RBC 5.49   HGB 16.5   HCT 46.4   MCV 84.5   MCH 30.1   MCHC 35.6   RDW 13.0      MPV 9.0     Chemistry:  Recent Labs     23  0202      K 4.0      CO2 24   GLUCOSE 144*   BUN 15   CREATININE 0.90   MG 2.6   ANIONGAP 13   LABGLOM >60   CALCIUM 9.0     Recent Labs     23  0202   PROT 6.4   LABALBU 4.3   AST 19   ALT 28   ALKPHOS 69   BILITOT 0.4   LIPASE 21     ABG:No results found for: POCPH, PHART, PH, POCPCO2, UJT3YLI, PCO2, POCPO2, PO2ART, PO2, POCHCO3, PNG5OUT, HCO3, NBEA, PBEA, BEART, BE, THGBART, THB, BTD0AZP, YWEW9WDJ, L7ADFOWF, O2SAT, FIO2  No results found for: SPECIAL  No results found for: CULTURE    Radiology:  CT ABDOMEN PELVIS WO CONTRAST Additional Contrast? None    Result Date: 2023  Marked acute appendicitis with possible 2.6 x 2.0 x 1.5 cm abscess at the terminus. No free fluid or free air evident. XR ABDOMEN (KUB) (SINGLE AP VIEW)    Result Date: 2023  No pneumoperitoneum. Appendicoliths in the right lower quadrant corresponding to acute appendicitis. US SCROTUM W LIMITED DUPLEX    Result Date: 2023  1. No acute abnormality identified. 2.  Incidental bilateral epididymal head cysts. Physical Examination:       General appearance:  alert, cooperative and no distress  Mental Status:  oriented to person, place and time and normal affect  Lungs:  clear to auscultation bilaterally, normal effort  Heart:  regular rate and rhythm, no murmur  Abdomen:  soft, generalized tenderness, minimally distended, normal bowel sounds, no masses, hepatomegaly, splenomegaly  Extremities:  no edema, redness, tenderness in the calves  Skin:  no gross lesions, rashes, induration    Assessment:     Hospital Problems             Last Modified POA    * (Principal) Appendiceal abscess 1/7/2023 Yes    Acute appendicitis with generalized peritonitis and abscess, without gangrene 1/7/2023 Yes       Plan:     Acute appendicitis  Status post appendectomy, postop day #1  Postoperative ileus  Continue clear liquid diet, monitor for return of bowel function  Dyslipidemia  Continue Lipitor  Depression  Continue Vyvanse and Zoloft    Patient encouraged to ambulate, await return of bowel function. Check morning labs.     Mariya Sloan DO  1/8/2023  12:13 PM

## 2023-01-09 ENCOUNTER — TELEPHONE (OUTPATIENT)
Dept: SURGERY | Age: 43
End: 2023-01-09

## 2023-01-09 VITALS
OXYGEN SATURATION: 94 % | HEART RATE: 81 BPM | TEMPERATURE: 98.1 F | SYSTOLIC BLOOD PRESSURE: 131 MMHG | WEIGHT: 170.42 LBS | RESPIRATION RATE: 16 BRPM | BODY MASS INDEX: 24.4 KG/M2 | HEIGHT: 70 IN | DIASTOLIC BLOOD PRESSURE: 89 MMHG

## 2023-01-09 LAB
ANION GAP SERPL CALCULATED.3IONS-SCNC: 7 MMOL/L (ref 9–17)
BUN BLDV-MCNC: 10 MG/DL (ref 6–20)
CALCIUM SERPL-MCNC: 8.5 MG/DL (ref 8.6–10.4)
CHLORIDE BLD-SCNC: 105 MMOL/L (ref 98–107)
CO2: 25 MMOL/L (ref 20–31)
CREAT SERPL-MCNC: 0.98 MG/DL (ref 0.7–1.2)
GFR SERPL CREATININE-BSD FRML MDRD: >60 ML/MIN/1.73M2
GLUCOSE BLD-MCNC: 111 MG/DL (ref 70–99)
HCT VFR BLD CALC: 37.2 % (ref 41–53)
HEMOGLOBIN: 12.8 G/DL (ref 13.5–17.5)
MCH RBC QN AUTO: 30.1 PG (ref 26–34)
MCHC RBC AUTO-ENTMCNC: 34.4 G/DL (ref 31–37)
MCV RBC AUTO: 87.3 FL (ref 80–100)
PDW BLD-RTO: 13.6 % (ref 12.5–15.4)
PLATELET # BLD: 225 K/UL (ref 140–450)
PMV BLD AUTO: 7.2 FL (ref 6–12)
POTASSIUM SERPL-SCNC: 3.8 MMOL/L (ref 3.7–5.3)
RBC # BLD: 4.26 M/UL (ref 4.5–5.9)
SODIUM BLD-SCNC: 137 MMOL/L (ref 135–144)
WBC # BLD: 8.9 K/UL (ref 3.5–11)

## 2023-01-09 PROCEDURE — 6370000000 HC RX 637 (ALT 250 FOR IP): Performed by: SURGERY

## 2023-01-09 PROCEDURE — 6370000000 HC RX 637 (ALT 250 FOR IP): Performed by: NURSE PRACTITIONER

## 2023-01-09 PROCEDURE — 80048 BASIC METABOLIC PNL TOTAL CA: CPT

## 2023-01-09 PROCEDURE — 99238 HOSP IP/OBS DSCHRG MGMT 30/<: CPT | Performed by: STUDENT IN AN ORGANIZED HEALTH CARE EDUCATION/TRAINING PROGRAM

## 2023-01-09 PROCEDURE — 2580000003 HC RX 258: Performed by: SURGERY

## 2023-01-09 PROCEDURE — 85027 COMPLETE CBC AUTOMATED: CPT

## 2023-01-09 PROCEDURE — 6360000002 HC RX W HCPCS: Performed by: SURGERY

## 2023-01-09 PROCEDURE — 36415 COLL VENOUS BLD VENIPUNCTURE: CPT

## 2023-01-09 RX ORDER — CIPROFLOXACIN 250 MG/1
500 TABLET, FILM COATED ORAL EVERY 12 HOURS SCHEDULED
Status: DISCONTINUED | OUTPATIENT
Start: 2023-01-09 | End: 2023-01-09 | Stop reason: HOSPADM

## 2023-01-09 RX ORDER — METRONIDAZOLE 500 MG/1
500 TABLET ORAL EVERY 8 HOURS SCHEDULED
Status: DISCONTINUED | OUTPATIENT
Start: 2023-01-09 | End: 2023-01-09 | Stop reason: HOSPADM

## 2023-01-09 RX ORDER — CIPROFLOXACIN 500 MG/1
500 TABLET, FILM COATED ORAL 2 TIMES DAILY
Qty: 20 TABLET | Refills: 0 | Status: SHIPPED | OUTPATIENT
Start: 2023-01-09 | End: 2023-01-19

## 2023-01-09 RX ORDER — OXYCODONE HYDROCHLORIDE AND ACETAMINOPHEN 5; 325 MG/1; MG/1
1 TABLET ORAL EVERY 6 HOURS PRN
Qty: 12 TABLET | Refills: 0 | Status: SHIPPED | OUTPATIENT
Start: 2023-01-09 | End: 2023-01-09 | Stop reason: SDUPTHER

## 2023-01-09 RX ORDER — OXYCODONE HYDROCHLORIDE AND ACETAMINOPHEN 5; 325 MG/1; MG/1
1 TABLET ORAL EVERY 6 HOURS PRN
Qty: 15 TABLET | Refills: 0 | Status: SHIPPED | OUTPATIENT
Start: 2023-01-09 | End: 2023-01-16

## 2023-01-09 RX ORDER — METRONIDAZOLE 500 MG/1
500 TABLET ORAL 3 TIMES DAILY
Qty: 30 TABLET | Refills: 0 | Status: SHIPPED | OUTPATIENT
Start: 2023-01-09 | End: 2023-01-19

## 2023-01-09 RX ADMIN — OXYCODONE HYDROCHLORIDE AND ACETAMINOPHEN 1 TABLET: 5; 325 TABLET ORAL at 09:54

## 2023-01-09 RX ADMIN — METRONIDAZOLE 500 MG: 500 TABLET ORAL at 09:54

## 2023-01-09 RX ADMIN — POLYETHYLENE GLYCOL 3350 17 G: 17 POWDER, FOR SOLUTION ORAL at 11:35

## 2023-01-09 RX ADMIN — PIPERACILLIN AND TAZOBACTAM 3375 MG: 3; .375 INJECTION, POWDER, FOR SOLUTION INTRAVENOUS at 04:50

## 2023-01-09 RX ADMIN — OXYCODONE HYDROCHLORIDE AND ACETAMINOPHEN 1 TABLET: 5; 325 TABLET ORAL at 00:13

## 2023-01-09 RX ADMIN — ATORVASTATIN CALCIUM 10 MG: 10 TABLET, FILM COATED ORAL at 09:54

## 2023-01-09 RX ADMIN — SERTRALINE HYDROCHLORIDE 50 MG: 50 TABLET ORAL at 09:55

## 2023-01-09 RX ADMIN — METRONIDAZOLE 500 MG: 500 TABLET ORAL at 13:58

## 2023-01-09 RX ADMIN — CIPROFLOXACIN 500 MG: 250 TABLET, FILM COATED ORAL at 09:54

## 2023-01-09 RX ADMIN — OXYCODONE HYDROCHLORIDE AND ACETAMINOPHEN 1 TABLET: 5; 325 TABLET ORAL at 13:58

## 2023-01-09 ASSESSMENT — PAIN SCALES - GENERAL
PAINLEVEL_OUTOF10: 8
PAINLEVEL_OUTOF10: 7
PAINLEVEL_OUTOF10: 7

## 2023-01-09 ASSESSMENT — PAIN DESCRIPTION - LOCATION
LOCATION: ABDOMEN
LOCATION: ABDOMEN

## 2023-01-09 NOTE — PROGRESS NOTES
[facility] General Surgery Progress Note            PATIENT NAME: Lashawn Fitzgerald DATE: 2023, 8:11 AM    Chief Complaint   Patient presents with    Abdominal Pain       SUBJECTIVE:    Pt seen and examined. No acute events overnight, overall feeling better, tolerating CLD. No flatus/BM. OBJECTIVE:   Vitals:  BP (!) 135/95   Pulse 69   Temp 98.4 °F (36.9 °C) (Oral)   Resp 16   Ht 5' 10\" (1.778 m)   Wt 170 lb 6.7 oz (77.3 kg)   SpO2 93%   BMI 24.45 kg/m²    TEMPERATURE:  Current - Temp: 98.4 °F (36.9 °C);  Max - Temp  Av.2 °F (36.8 °C)  Min: 97.9 °F (36.6 °C)  Max: 98.4 °F (36.9 °C)    INTAKE/OUTPUT:      Intake/Output Summary (Last 24 hours) at 2023 0811  Last data filed at 2023 1827  Gross per 24 hour   Intake --   Output 1050 ml   Net -1050 ml                 General: AOx3, NAD  Lungs: Symmetrical chest rise bilaterally, no audible wheezes or rhonchi  Heart: S1S2  Abdomen: Soft, appropriately tender around incisions, no evidence of bleeding/infection  Extremity: moves all extremities x4, No edema      Data Review:  CBC:   Recent Labs     23  0202 23  1301 23  0634   WBC 15.5* 12.1* 8.9   HGB 16.5 14.1 12.8*    292 225     BMP:    Recent Labs     23  0202 23  0634    137   K 4.0 3.8    105   CO2 24 25   BUN 15 10   CREATININE 0.90 0.98   GLUCOSE 144* 111*     Hepatic:   Recent Labs     23   AST 19   ALT 28   ALKPHOS 69   BILITOT 0.4     Coagulation:   Recent Labs     23  020   PROT 6.4             ASSESSMENT     Patient Active Problem List   Diagnosis    Bipolar 1 disorder ([facility] [address]    Appendiceal abscess    Acute appendicitis with generalized peritonitis and abscess, without gangrene   POD 2 robot appendectomy        PLAN    Advance diet  Oral abx x1 dose prior to discharge  Plan DC later today if tolerating oral diet  Outpt follow up in two weeks    Electronically signed by Nciki Rooney DO on 2023 at 8:12 AM

## 2023-01-09 NOTE — PLAN OF CARE
Problem: Discharge Planning  Goal: Discharge to home or other facility with appropriate resources  Outcome: Progressing     Problem: Pain  Goal: Verbalizes/displays adequate comfort level or baseline comfort level  Outcome: Progressing  Flowsheets (Taken 1/8/2023 3387)  Verbalizes/displays adequate comfort level or baseline comfort level:   Encourage patient to monitor pain and request assistance   Assess pain using appropriate pain scale   Administer analgesics based on type and severity of pain and evaluate response   Implement non-pharmacological measures as appropriate and evaluate response

## 2023-01-09 NOTE — DISCHARGE SUMMARY
[facility]  Office: [address], DO, Yoana Lund, DO, Carol Ortega, DO, Faith Nuñezaniyah Ryan, DO, John Gibson MD, Sahra Ledbetter MD, Silvano Bowser MD, Tank Bray MD,  Jenni James MD, Joanna Martinez MD, Evaristo Hamilton DO, America Calvert MD,  Oniel Toledo MD, Eric Marshall MD, Alex Montiel DO, Jamison Alba MD, Dae Grimes MD, Kimberly Nguyễn DO, Ling Moreland MD, Kavitha Martell MD, Rock Young MD, Naima Mahoney MD, Arnulfo Cordoba DO, Bia Galeas MD, Yesika Gaitan MD, Chivo Salguero, CNP,  Geoffrey Dominguez, CNP, Silvia Fleming, CNP, Zachery Gaitan, CNP,  Barbara Medico, [facility], Germain Perckaya, CNP, Lissa Garzon, CNP, Angeles Biswas, CNP, Jennifer Walker, CNP, Suzan Faye, CNP, Wilfredo Alcala, PA-C, Mari Hastings, CNS, Ethan Nathan, CNP, Shahida Cheatham, [facility]         [facility]    Discharge Summary     Patient ID: Michelle Martell  :  1980   MRN: [id number]     ACCOUNT:  [de-identified]   Patient's PCP: Adolfo Adkins MD  Admit Date: 2023   Discharge Date: 2023     Length of Stay: 2  Code Status:  Full Code  Admitting Physician: No admitting provider for patient encounter. Discharge Physician: Eric Marshall MD     Active Discharge Diagnoses:     Hospital Problem Lists:  Principal Problem:    Appendiceal abscess  Active Problems:    Acute appendicitis with generalized peritonitis and abscess, without gangrene  Resolved Problems:    * No resolved hospital problems. *      Admission Condition:  poor     Discharged Condition: good    Hospital Stay:     Hospital Course:  Michelle Martell is a 38 y.o. male with a past medical history of bipolar disorder who presented to the emergency department on 2023 complaining of intractable abdominal pain. The patient states that his pain began about three-days-ago and has progressively worsened.  In the ED, the patient was afebrile and nontoxic appearing. CT scan of the abdomen and pelvis was done and was remarkable for acute appendicitis with abscess formation. He was admitted to internal medicine for further management. General surgery was consulted on admission and took the patient to the OR for laparoscopic appendectomy on 1/7. He improved following appendectomy. The patient is discharged home today (1/9) in stable condition. He is instructed to take ciprofloxacin and metronidazole for ten-days as prescribed and follow-up with general surgery in two-weeks as scheduled. Significant therapeutic interventions: IV antibiotics; laparoscopic appendectomy     Significant Diagnostic Studies:   Labs / Micro:  CBC:   Lab Results   Component Value Date/Time    WBC 8.9 01/09/2023 06:34 AM    RBC 4.26 01/09/2023 06:34 AM    RBC 5.04 03/29/2012 11:28 AM    HGB 12.8 01/09/2023 06:34 AM    HCT 37.2 01/09/2023 06:34 AM    MCV 87.3 01/09/2023 06:34 AM    MCH 30.1 01/09/2023 06:34 AM    MCHC 34.4 01/09/2023 06:34 AM    RDW 13.6 01/09/2023 06:34 AM     01/09/2023 06:34 AM     03/29/2012 11:28 AM     Radiology:  CT ABDOMEN PELVIS WO CONTRAST Additional Contrast? None    Result Date: 1/7/2023  Marked acute appendicitis with possible 2.6 x 2.0 x 1.5 cm abscess at the terminus. No free fluid or free air evident. XR ABDOMEN (KUB) (SINGLE AP VIEW)    Result Date: 1/7/2023  No pneumoperitoneum. Appendicoliths in the right lower quadrant corresponding to acute appendicitis. US SCROTUM W LIMITED DUPLEX    Result Date: 1/7/2023  1. No acute abnormality identified. 2.  Incidental bilateral epididymal head cysts. Consultations:    Consults:     Final Specialist Recommendations/Findings:   IP CONSULT TO GENERAL SURGERY      The patient was seen and examined on day of discharge and this discharge summary is in conjunction with any daily progress note from day of discharge.     Discharge plan:     Disposition: Home    Physician Follow Up: Jacquelynn Scheuermann,   [address] [place] [id number]  [phone]    Follow up in 2 week(s)  Post-op follow-up     Requiring Further Evaluation/Follow Up POST HOSPITALIZATION/Incidental Findings: None. Diet: regular diet    Activity: As tolerated    Instructions to Patient: Take ciprofloxacin and metronidazole for ten-days as prescribed and follow-up with general surgery in two-weeks as scheduled. Discharge Medications:      Medication List        START taking these medications      ciprofloxacin 500 MG tablet  Commonly known as: CIPRO  Take 1 tablet by mouth 2 times daily for 10 days     metroNIDAZOLE 500 MG tablet  Commonly known as: FLAGYL  Take 1 tablet by mouth 3 times daily for 10 days     oxyCODONE-acetaminophen 5-325 MG per tablet  Commonly known as: Percocet  Take 1 tablet by mouth every 6 hours as needed for Pain for up to 7 days. Intended supply: 3 days. Take lowest dose possible to manage pain Max Daily Amount: 4 tablets            CONTINUE taking these medications      atorvastatin 10 MG tablet  Commonly known as: LIPITOR     haloperidol 1 MG tablet  Commonly known as: HALDOL     montelukast 10 MG tablet  Commonly known as: SINGULAIR     sertraline 50 MG tablet  Commonly known as: ZOLOFT     Vyvanse 60 MG Caps  Generic drug: Lisdexamfetamine Dimesylate            STOP taking these medications      buPROPion 300 MG extended release tablet  Commonly known as: WELLBUTRIN XL     clonazePAM 0.5 MG tablet  Commonly known as: KLONOPIN               Where to Get Your Medications        These medications were sent to [facility] [address], [address] [place]  [address]      Phone: [phone]   ciprofloxacin 500 MG tablet  metroNIDAZOLE 500 MG tablet  oxyCODONE-acetaminophen 5-325 MG per tablet         No discharge procedures on file.     Time Spent on discharge is  20 mins in patient examination, evaluation, counseling as well as medication reconciliation, prescriptions for required medications, discharge plan and follow up. Electronically signed by   Eric Marshall MD  1/9/2023  8:51 AM      Thank you Dr. Adolfo Adkins MD for the opportunity to be involved in this patient's care.

## 2023-01-09 NOTE — PLAN OF CARE
Problem: Discharge Planning  Goal: Discharge to home or other facility with appropriate resources  1/9/2023 1750 by Shazia Harmon RN  Outcome: Completed  Flowsheets (Taken 1/9/2023 0830)  Discharge to home or other facility with appropriate resources: Identify barriers to discharge with patient and caregiver  1/9/2023 0654 by Selwyn Kocher, RN  Outcome: Progressing     Problem: Pain  Goal: Verbalizes/displays adequate comfort level or baseline comfort level  1/9/2023 1750 by Shazia Harmon RN  Outcome: Completed  Flowsheets (Taken 1/9/2023 1358)  Verbalizes/displays adequate comfort level or baseline comfort level:   Encourage patient to monitor pain and request assistance   Assess pain using appropriate pain scale  1/9/2023 0654 by Selwyn Kocher, RN  Outcome: Progressing  Flowsheets (Taken 1/8/2023 2334)  Verbalizes/displays adequate comfort level or baseline comfort level:   Encourage patient to monitor pain and request assistance   Assess pain using appropriate pain scale   Administer analgesics based on type and severity of pain and evaluate response   Implement non-pharmacological measures as appropriate and evaluate response

## 2023-01-09 NOTE — DISCHARGE INSTRUCTIONS
Patient Discharge Instructions  Discharge Date:  1/9/2023    HYGEINE: [id number] Gila Vences to shower 01/10/23, no soaking in a tub/pool until seen at your follow up appointment. Clean incision daily with gentle soap and water, do not use alcohol/peroxide or any harsh cleansers. DRIVING: No driving while taking narcotic medications or while in pain    ACTIVITY: No lifting greater than 20lbs for 6 weeks or any strenuous activity. DIET: Resume your normal diet as advised by your PCP. MEDICATIONS: Take all medications as prescribed. Take Colace until you have your first bowel movement, continue to take if you are using narcotics for pain control    SPECIAL INSTRUCTIONS:     Follow up with Dr. Samara Ge in 10-14 days, call the clinic for appointment. Call sooner if fever above 100.4 degrees Farenheit, increase in swelling or redness, thick purulent discharge or pain not controlled with medications.

## 2023-01-09 NOTE — CARE COORDINATION
Case Management Assessment  Initial Evaluation    Date/Time of Evaluation: 1/9/2023 10:01 AM  Assessment Completed by: Nehemiah Herrera RN    If patient is discharged prior to next notation, then this note serves as note for discharge by case management. Patient Name: Rea Barlow                   YOB: 1980  Diagnosis: Appendiceal abscess [K35.33]  Acute appendicitis with generalized peritonitis and abscess, without gangrene, unspecified whether perforation present [K35.21]                   Date / Time: 1/7/2023  1:52 AM    Patient Admission Status: Inpatient   Readmission Risk (Low < 19, Mod (19-27), High > 27): Readmission Risk Score: 5.4    Current PCP: Khadijah Quinonez MD  PCP verified by CM? Chart Reviewed: Yes      History Provided by: Patient  Patient Orientation: Alert and Oriented    Patient Cognition: Alert    Hospitalization in the last 30 days (Readmission):  No    If yes, Readmission Assessment in CM Navigator will be completed. Advance Directives:      Code Status: Full Code   Patient's Primary Decision Maker is: Legal Next of Kin      Discharge Planning:    Patient lives with: Spouse/Significant Other, Children Type of Home: House  Primary Care Giver: Self  Patient Support Systems include: None   Current Financial resources:    Current community resources:    Current services prior to admission: None            Current DME:              Type of Home Care services:  None    ADLS  Prior functional level: Independent in ADLs/IADLs  Current functional level: Independent in ADLs/IADLs    PT AM-PAC:   /24  OT AM-PAC:   /24    Family can provide assistance at DC: Would you like Case Management to discuss the discharge plan with any other family members/significant others, and if so, who?     Plans to Return to Present Housing: Yes  Other Identified Issues/Barriers to RETURNING to current housing: NA  Potential Assistance needed at discharge: N/A            Potential DME: Patient expects to discharge to: House  Plan for transportation at discharge: Family    Financial    Payor: Jevon Carpenter / Plan: [facility] (HMO) / Product Type: *No Product type* /     Does insurance require precert for SNF: Yes    Potential assistance Purchasing Medications:    Meds-to-Beds request:        [facility]enny [facility] # [address], [address] [facility]  [address] [place] [address]  Phone: [phone] Fax: [phone]      Notes:    Factors facilitating achievement of predicted outcomes:     Barriers to discharge: Additional Case Management Notes: d/c home independent    The Plan for Transition of Care is related to the following treatment goals of Appendiceal abscess [K35.33]  Acute appendicitis with generalized peritonitis and abscess, without gangrene, unspecified whether perforation present [Y30.58]    IF APPLICABLE: The Patient and/or patient representative Josh Byrd and his family were provided with a choice of provider and agrees with the discharge plan. Freedom of choice list with basic dialogue that supports the patient's individualized plan of care/goals and shares the quality data associated with the providers was provided to: Patient   Patient Representative Name:       The Patient and/or Patient Representative Agree with the Discharge Plan?  Yes    Yadira Farr RN  Case Management Department  Ph: [phone] Fax: [phone]

## 2023-01-10 LAB — SURGICAL PATHOLOGY REPORT: NORMAL

## 2023-01-11 ENCOUNTER — TELEPHONE (OUTPATIENT)
Dept: SURGERY | Age: 43
End: 2023-01-11

## 2023-01-11 RX ORDER — POLYETHYLENE GLYCOL 3350 17 G/17G
17 POWDER, FOR SOLUTION ORAL 2 TIMES DAILY
Qty: 255 G | Refills: 3 | Status: SHIPPED | OUTPATIENT
Start: 2023-01-11

## 2023-01-11 RX ORDER — DOCUSATE SODIUM 100 MG/1
100 CAPSULE, LIQUID FILLED ORAL 2 TIMES DAILY
Qty: 60 CAPSULE | Refills: 0 | Status: SHIPPED | OUTPATIENT
Start: 2023-01-11

## 2023-01-11 NOTE — TELEPHONE ENCOUNTER
Pt call complaint  that since his surgery 01/07/23 he hasn't have bowel movent, pt state he is passing gas. Pt is taking Luster Richard, pt still taking his pain medication one tablet at day. patient denied any pain. Per. Dr. Warner Level will continued taking Luster Richard passing gas is reinforced that he will have a bowel moment   Pt notified that new RX was send to his pharmacy, but pt will not start those medication yet .  Pt agree to plan if any other question he will call back

## 2023-01-17 ENCOUNTER — TELEPHONE (OUTPATIENT)
Dept: SURGERY | Age: 43
End: 2023-01-17

## 2023-01-17 NOTE — TELEPHONE ENCOUNTER
Patient called and stated he went back to work yesterday as he was feeling OK to go back (due back to work tomorrow 01/18), he works 24 hours a week.   When he went back yesterday he needed a return to work letter, pt is asking if one could be faxed to his employer at [phone], please call pt when fax is done pt is at [phone], thank you

## 2023-01-23 ENCOUNTER — OFFICE VISIT (OUTPATIENT)
Dept: SURGERY | Age: 43
End: 2023-01-23

## 2023-01-23 VITALS
BODY MASS INDEX: 24.34 KG/M2 | DIASTOLIC BLOOD PRESSURE: 80 MMHG | WEIGHT: 170 LBS | RESPIRATION RATE: 16 BRPM | HEIGHT: 70 IN | SYSTOLIC BLOOD PRESSURE: 125 MMHG | HEART RATE: 90 BPM | OXYGEN SATURATION: 100 %

## 2023-01-23 DIAGNOSIS — Z90.49 STATUS POST LAPAROSCOPIC APPENDECTOMY: Primary | ICD-10-CM

## 2023-01-23 PROCEDURE — 99024 POSTOP FOLLOW-UP VISIT: CPT | Performed by: SURGERY

## 2023-01-23 NOTE — LETTER
[facility]  Surgical Specialties - General Surgery  [address]  [place], [address]  Phone: [phone]  Fax: [phone]      23    Patient: Ale Peters  MRN: [id number]  : 1980  Date of visit: 2023    Dear Benson Saavedra MD:      I saw Ale Peters in follow up to robot appendectomy, he is doing well. Below are the relevant portions of my assessment and plan of care. If you have questions, please do not hesitate to call me. I look forward to following Ale Peters along with you. Sincerely,        Tiny Parra DO      [facility] General Surgery   History & Physical  Tiny Parra DO    PATIENT NAME: Ale Peters     CLINIC VISIT DATE: 2023    SUBJECTIVE:  Aki Peters is a 38 y.o. male who presents to the clinic today for follow up to robot appendectomy performed 23. No new issues since surgery, pt is tolerating regular diet and having normal BM. Pathology as follows:    -- Diagnosis --   APPENDIX, APPENDECTOMY:   - ACUTE SUPPURATIVE APPENDICITIS WITH ACUTE   PERITONITIS/PERIAPPENDICEAL    ABSCESS.   - INTRALUMINAL FECALITH. COMMENT: PER THE OPERATIVE NOTE, THE PATIENT WAS FOUND TO HAVE ACUTE   APPENDICITIS, GENERALIZED PERITONITIS AND ABSCESS BUT NO GROSS   PERFORATION. OBJECTIVE:    /80   Pulse 90   Resp 16   Ht 5' 10\" (1.778 m)   Wt 170 lb (77.1 kg)   SpO2 100%   BMI 24.39 kg/m²     General Appearance:  awake, alert, no acute distress, well developed, well nourished   Skin:  Skin color, texture, turgor normal. No rashes or lesions. Lungs:  Normal chest expansion, unlabored breathing without accessory muscle use. No audible rales, rhonchi, or wheezing. Cardiovascular: S1S2. No evidence of JVD. No evidence of pulsatile masses in abdomen  Abdomen:  Soft, non-tender, no organomegaly, no masses.  Incisions C/D/I without evidence of bleeding/infection  Musculoskeletal: No evidence of bony/muscular deformities, trauma, atrophy of either left/right upper/lower extremity. No evidence of digital clubbing or cyanosis. ASSESSMENT:  1. Status post laparoscopic appendectomy          PLAN:  1. Lifting restriction to less than 20lbs for the next 4 weeks, unrestricted after this.   2. F/U prn    Electronically signed by Lobo Kamara DO on 1/23/2023 at 4:20 PM

## 2023-01-23 NOTE — PROGRESS NOTES
[facility] General Surgery   History & Physical  Nadege Alamo DO    PATIENT NAME: Zenobia Witt. [facility]     CLINIC VISIT DATE: 1/23/2023    SUBJECTIVE:  Chiquis RENO [facility] is a 38 y.o. male who presents to the clinic today for follow up to robot appendectomy performed 1/7/23. No new issues since surgery, pt is tolerating regular diet and having normal BM. Pathology as follows:    -- Diagnosis --   APPENDIX, APPENDECTOMY:   - ACUTE SUPPURATIVE APPENDICITIS WITH ACUTE   PERITONITIS/PERIAPPENDICEAL    ABSCESS.   - INTRALUMINAL FECALITH. COMMENT: PER THE OPERATIVE NOTE, THE PATIENT WAS FOUND TO HAVE ACUTE   APPENDICITIS, GENERALIZED PERITONITIS AND ABSCESS BUT NO GROSS   PERFORATION. OBJECTIVE:    /80   Pulse 90   Resp 16   Ht 5' 10\" (1.778 m)   Wt 170 lb (77.1 kg)   SpO2 100%   BMI 24.39 kg/m²     General Appearance:  awake, alert, no acute distress, well developed, well nourished   Skin:  Skin color, texture, turgor normal. No rashes or lesions. Lungs:  Normal chest expansion, unlabored breathing without accessory muscle use. No audible rales, rhonchi, or wheezing. Cardiovascular: S1S2. No evidence of JVD. No evidence of pulsatile masses in abdomen  Abdomen:  Soft, non-tender, no organomegaly, no masses. Incisions C/D/I without evidence of bleeding/infection  Musculoskeletal: No evidence of bony/muscular deformities, trauma, atrophy of either left/right upper/lower extremity. No evidence of digital clubbing or cyanosis. ASSESSMENT:  1. Status post laparoscopic appendectomy          PLAN:  Lifting restriction to less than 20lbs for the next 4 weeks, unrestricted after this.   F/U prn    Electronically signed by Woods Hole Oceanographic InstituteDO on 1/23/2023 at 4:20 PM

## 2023-06-26 ENCOUNTER — OFFICE VISIT (OUTPATIENT)
Dept: FAMILY MEDICINE CLINIC | Age: 43
End: 2023-06-26
Payer: COMMERCIAL

## 2023-06-26 VITALS
DIASTOLIC BLOOD PRESSURE: 78 MMHG | RESPIRATION RATE: 16 BRPM | SYSTOLIC BLOOD PRESSURE: 126 MMHG | WEIGHT: 170.6 LBS | BODY MASS INDEX: 24.48 KG/M2 | HEART RATE: 66 BPM | OXYGEN SATURATION: 98 % | TEMPERATURE: 98.8 F

## 2023-06-26 DIAGNOSIS — M79.10 MUSCLE ACHE: ICD-10-CM

## 2023-06-26 DIAGNOSIS — R52 GENERALIZED BODY ACHES: Primary | ICD-10-CM

## 2023-06-26 PROBLEM — F95.2 GILLES DE LA TOURETTE'S SYNDROME: Status: ACTIVE | Noted: 2020-01-01

## 2023-06-26 PROBLEM — E78.00 HIGH CHOLESTEROL: Status: ACTIVE | Noted: 2023-06-26

## 2023-06-26 PROBLEM — J45.909 ASTHMA: Status: ACTIVE | Noted: 2020-01-01

## 2023-06-26 PROBLEM — F90.0 ATTENTION DEFICIT HYPERACTIVITY DISORDER, PREDOMINANTLY INATTENTIVE TYPE: Status: ACTIVE | Noted: 2020-01-01

## 2023-06-26 PROCEDURE — 99203 OFFICE O/P NEW LOW 30 MIN: CPT | Performed by: NURSE PRACTITIONER

## 2023-06-26 RX ORDER — HYDROXYZINE HYDROCHLORIDE 25 MG/1
25 TABLET, FILM COATED ORAL 3 TIMES DAILY PRN
COMMUNITY

## 2023-06-26 SDOH — ECONOMIC STABILITY: INCOME INSECURITY: HOW HARD IS IT FOR YOU TO PAY FOR THE VERY BASICS LIKE FOOD, HOUSING, MEDICAL CARE, AND HEATING?: NOT HARD AT ALL

## 2023-06-26 SDOH — ECONOMIC STABILITY: FOOD INSECURITY: WITHIN THE PAST 12 MONTHS, THE FOOD YOU BOUGHT JUST DIDN'T LAST AND YOU DIDN'T HAVE MONEY TO GET MORE.: NEVER TRUE

## 2023-06-26 SDOH — ECONOMIC STABILITY: HOUSING INSECURITY
IN THE LAST 12 MONTHS, WAS THERE A TIME WHEN YOU DID NOT HAVE A STEADY PLACE TO SLEEP OR SLEPT IN A SHELTER (INCLUDING NOW)?: NO

## 2023-06-26 SDOH — ECONOMIC STABILITY: FOOD INSECURITY: WITHIN THE PAST 12 MONTHS, YOU WORRIED THAT YOUR FOOD WOULD RUN OUT BEFORE YOU GOT MONEY TO BUY MORE.: NEVER TRUE

## 2023-06-26 ASSESSMENT — ENCOUNTER SYMPTOMS
ABDOMINAL PAIN: 0
EYE PAIN: 0
NAUSEA: 1
RHINORRHEA: 0
VOMITING: 0
SHORTNESS OF BREATH: 0
BACK PAIN: 1
COUGH: 0

## 2023-06-26 ASSESSMENT — PATIENT HEALTH QUESTIONNAIRE - PHQ9
SUM OF ALL RESPONSES TO PHQ QUESTIONS 1-9: 7
10. IF YOU CHECKED OFF ANY PROBLEMS, HOW DIFFICULT HAVE THESE PROBLEMS MADE IT FOR YOU TO DO YOUR WORK, TAKE CARE OF THINGS AT HOME, OR GET ALONG WITH OTHER PEOPLE: 1
8. MOVING OR SPEAKING SO SLOWLY THAT OTHER PEOPLE COULD HAVE NOTICED. OR THE OPPOSITE, BEING SO FIGETY OR RESTLESS THAT YOU HAVE BEEN MOVING AROUND A LOT MORE THAN USUAL: 1
1. LITTLE INTEREST OR PLEASURE IN DOING THINGS: 0
4. FEELING TIRED OR HAVING LITTLE ENERGY: 2
6. FEELING BAD ABOUT YOURSELF - OR THAT YOU ARE A FAILURE OR HAVE LET YOURSELF OR YOUR FAMILY DOWN: 0
SUM OF ALL RESPONSES TO PHQ QUESTIONS 1-9: 7
3. TROUBLE FALLING OR STAYING ASLEEP: 2
9. THOUGHTS THAT YOU WOULD BE BETTER OFF DEAD, OR OF HURTING YOURSELF: 0
SUM OF ALL RESPONSES TO PHQ QUESTIONS 1-9: 7
5. POOR APPETITE OR OVEREATING: 0
7. TROUBLE CONCENTRATING ON THINGS, SUCH AS READING THE NEWSPAPER OR WATCHING TELEVISION: 1
SUM OF ALL RESPONSES TO PHQ9 QUESTIONS 1 & 2: 1
SUM OF ALL RESPONSES TO PHQ QUESTIONS 1-9: 7
2. FEELING DOWN, DEPRESSED OR HOPELESS: 1

## 2023-11-08 ENCOUNTER — OFFICE VISIT (OUTPATIENT)
Dept: FAMILY MEDICINE CLINIC | Age: 43
End: 2023-11-08
Payer: COMMERCIAL

## 2023-11-08 VITALS
BODY MASS INDEX: 24.39 KG/M2 | SYSTOLIC BLOOD PRESSURE: 122 MMHG | OXYGEN SATURATION: 97 % | HEART RATE: 80 BPM | DIASTOLIC BLOOD PRESSURE: 76 MMHG | RESPIRATION RATE: 16 BRPM | WEIGHT: 170 LBS | TEMPERATURE: 97.5 F

## 2023-11-08 DIAGNOSIS — J06.9 VIRAL URI: Primary | ICD-10-CM

## 2023-11-08 PROCEDURE — 99213 OFFICE O/P EST LOW 20 MIN: CPT | Performed by: NURSE PRACTITIONER

## 2023-11-08 RX ORDER — METHYLPREDNISOLONE 4 MG/1
TABLET ORAL
Qty: 1 KIT | Refills: 0 | Status: SHIPPED | OUTPATIENT
Start: 2023-11-08 | End: 2023-11-14

## 2023-11-08 RX ORDER — BROMPHENIRAMINE MALEATE, PSEUDOEPHEDRINE HYDROCHLORIDE, AND DEXTROMETHORPHAN HYDROBROMIDE 2; 30; 10 MG/5ML; MG/5ML; MG/5ML
5 SYRUP ORAL 4 TIMES DAILY PRN
Qty: 118 ML | Refills: 0 | Status: SHIPPED | OUTPATIENT
Start: 2023-11-08

## 2023-11-08 ASSESSMENT — ENCOUNTER SYMPTOMS
ABDOMINAL PAIN: 0
VOMITING: 0
RHINORRHEA: 1
SINUS PRESSURE: 1
NAUSEA: 0
DIARRHEA: 0
SWOLLEN GLANDS: 1
TROUBLE SWALLOWING: 0
SINUS PAIN: 0
CHEST TIGHTNESS: 0
WHEEZING: 0
EYE DISCHARGE: 0
COUGH: 1
VOICE CHANGE: 0
SORE THROAT: 1

## 2023-11-08 NOTE — PROGRESS NOTES
[address] [facility], [address] [place] [address]  [phone]  Fax: [phone]         Patient counseled: For cough, nonpharmacologic therapy options include throat lozenges, hot tea, honey, and smoking cessation if you are a smoker. Prescription options include Bromfed every 6 hours as needed. If you have asthma, you may use your rescue inhaler 1-2 puffs every 4-6 hours as needed for wheezing and/or cough. For concurrent cold symptoms, OTC medications such as Motrin/Tylenol can help with headache, pharyngitis, muscle aches, and joint pain. Antihistamines such as Zyrtec (cetirizine) and nasal sprays (Flonase) can help reduce runny nose and nasal congestion. These can be effective for cold symptoms associated with bronchitis/URI even though they are typically associated with seasonal allergies. Start steroid as directed    Patient given educational materials, see patient instructions. Discussed use, benefit, and side effects of prescribed medications. All patient questions answered. Pt verbalized understanding. Instructed to continue current medications, diet and exercise. Patient agreed with treatment plan. Follow up as directed.      Electronically signed by VONNIE Michael CNP on 11/8/2023 at 8:36 AM

## 2024-02-21 SDOH — HEALTH STABILITY: PHYSICAL HEALTH: ON AVERAGE, HOW MANY DAYS PER WEEK DO YOU ENGAGE IN MODERATE TO STRENUOUS EXERCISE (LIKE A BRISK WALK)?: 0 DAYS

## 2024-02-21 SDOH — HEALTH STABILITY: PHYSICAL HEALTH: ON AVERAGE, HOW MANY MINUTES DO YOU ENGAGE IN EXERCISE AT THIS LEVEL?: 0 MIN

## 2024-02-22 ENCOUNTER — OFFICE VISIT (OUTPATIENT)
Age: 44
End: 2024-02-22
Payer: COMMERCIAL

## 2024-02-22 VITALS
OXYGEN SATURATION: 98 % | HEART RATE: 78 BPM | TEMPERATURE: 97 F | WEIGHT: 168.2 LBS | DIASTOLIC BLOOD PRESSURE: 80 MMHG | BODY MASS INDEX: 22.78 KG/M2 | SYSTOLIC BLOOD PRESSURE: 114 MMHG | HEIGHT: 72 IN

## 2024-02-22 DIAGNOSIS — E78.2 MIXED HYPERLIPIDEMIA: ICD-10-CM

## 2024-02-22 DIAGNOSIS — F95.2 GILLES DE LA TOURETTE'S SYNDROME: ICD-10-CM

## 2024-02-22 DIAGNOSIS — F33.41 RECURRENT MAJOR DEPRESSIVE DISORDER, IN PARTIAL REMISSION (HCC): ICD-10-CM

## 2024-02-22 DIAGNOSIS — F90.0 ATTENTION DEFICIT HYPERACTIVITY DISORDER, PREDOMINANTLY INATTENTIVE TYPE: ICD-10-CM

## 2024-02-22 DIAGNOSIS — J45.20 MILD INTERMITTENT ASTHMA WITHOUT COMPLICATION: Primary | ICD-10-CM

## 2024-02-22 PROBLEM — K35.33 APPENDICEAL ABSCESS: Status: RESOLVED | Noted: 2023-01-07 | Resolved: 2024-02-22

## 2024-02-22 PROBLEM — K35.219 ACUTE APPENDICITIS WITH GENERALIZED PERITONITIS AND ABSCESS, WITHOUT GANGRENE: Status: RESOLVED | Noted: 2023-01-07 | Resolved: 2024-02-22

## 2024-02-22 PROCEDURE — 99204 OFFICE O/P NEW MOD 45 MIN: CPT | Performed by: PHYSICIAN ASSISTANT

## 2024-02-22 RX ORDER — FLUOXETINE HYDROCHLORIDE 20 MG/1
20 CAPSULE ORAL DAILY
COMMUNITY

## 2024-02-22 RX ORDER — LISDEXAMFETAMINE DIMESYLATE CAPSULES 40 MG/1
1 CAPSULE ORAL DAILY
COMMUNITY

## 2024-02-22 RX ORDER — ALBUTEROL SULFATE 90 UG/1
2 AEROSOL, METERED RESPIRATORY (INHALATION) EVERY 6 HOURS PRN
Qty: 18 G | Refills: 3 | Status: SHIPPED | OUTPATIENT
Start: 2024-02-22

## 2024-02-22 RX ORDER — HALOPERIDOL 2 MG/1
2 TABLET ORAL 2 TIMES DAILY
COMMUNITY

## 2024-02-22 ASSESSMENT — PATIENT HEALTH QUESTIONNAIRE - PHQ9
10. IF YOU CHECKED OFF ANY PROBLEMS, HOW DIFFICULT HAVE THESE PROBLEMS MADE IT FOR YOU TO DO YOUR WORK, TAKE CARE OF THINGS AT HOME, OR GET ALONG WITH OTHER PEOPLE: 0
SUM OF ALL RESPONSES TO PHQ QUESTIONS 1-9: 1
6. FEELING BAD ABOUT YOURSELF - OR THAT YOU ARE A FAILURE OR HAVE LET YOURSELF OR YOUR FAMILY DOWN: 0
4. FEELING TIRED OR HAVING LITTLE ENERGY: 0
9. THOUGHTS THAT YOU WOULD BE BETTER OFF DEAD, OR OF HURTING YOURSELF: 0
7. TROUBLE CONCENTRATING ON THINGS, SUCH AS READING THE NEWSPAPER OR WATCHING TELEVISION: 0
3. TROUBLE FALLING OR STAYING ASLEEP: 1
1. LITTLE INTEREST OR PLEASURE IN DOING THINGS: 0
SUM OF ALL RESPONSES TO PHQ QUESTIONS 1-9: 1
2. FEELING DOWN, DEPRESSED OR HOPELESS: 0
8. MOVING OR SPEAKING SO SLOWLY THAT OTHER PEOPLE COULD HAVE NOTICED. OR THE OPPOSITE, BEING SO FIGETY OR RESTLESS THAT YOU HAVE BEEN MOVING AROUND A LOT MORE THAN USUAL: 0
SUM OF ALL RESPONSES TO PHQ QUESTIONS 1-9: 1
SUM OF ALL RESPONSES TO PHQ9 QUESTIONS 1 & 2: 0
SUM OF ALL RESPONSES TO PHQ QUESTIONS 1-9: 1
5. POOR APPETITE OR OVEREATING: 0

## 2024-02-22 ASSESSMENT — ENCOUNTER SYMPTOMS
BLOOD IN STOOL: 0
SORE THROAT: 0
ABDOMINAL PAIN: 0
EYE REDNESS: 0
EYE PAIN: 0
WHEEZING: 0
BACK PAIN: 0
RHINORRHEA: 0
VOMITING: 0
DIARRHEA: 0
SINUS PRESSURE: 0
SHORTNESS OF BREATH: 0
NAUSEA: 0
COUGH: 0
SINUS PAIN: 0
CONSTIPATION: 0

## 2024-02-22 NOTE — PROGRESS NOTES
Haldol per his psychiatrist Dr. Mendoza.  Taking Haldol 3 mg twice a day.  3. Mixed hyperlipidemia  -     Lipid Panel; Future  -     TSH; Future  -     Comprehensive Metabolic Panel; Future  Last lipids in June reviewed.  Patient has been on atorvastatin 10 mg for about a year for mildly elevated LDL and triglycerides.  He has been working on a healthy diet.  Plans to start exercising again.  Repeat labs prior to next visit  4. Attention deficit hyperactivity disorder, predominantly inattentive type  Symptoms are stable on Vyvanse.  He sees his psychiatrist regularly and has an appointment today.  5. Recurrent major depressive disorder, in partial remission (HCC)  Patient denies any depression symptoms.  Job is going well.  He has a 5-month-old daughter that is healthy.  He does see his psychiatrist regularly has an upcoming appointment.  He is on low-dose Prozac 20 mg daily.    Return in about 4 months (around 6/22/2024) for f/u labs, cholesterol.    COMMUNICATION:       Electronically signed by Jessica Adhikari PA-C on 2/22/2024 at 9:14 AM

## 2024-06-04 ENCOUNTER — OFFICE VISIT (OUTPATIENT)
Dept: FAMILY MEDICINE CLINIC | Age: 44
End: 2024-06-04
Payer: COMMERCIAL

## 2024-06-04 VITALS
SYSTOLIC BLOOD PRESSURE: 120 MMHG | RESPIRATION RATE: 14 BRPM | HEART RATE: 76 BPM | TEMPERATURE: 97 F | DIASTOLIC BLOOD PRESSURE: 78 MMHG | OXYGEN SATURATION: 98 %

## 2024-06-04 DIAGNOSIS — H61.899 LESION OF EAR CANAL: Primary | ICD-10-CM

## 2024-06-04 PROCEDURE — 99213 OFFICE O/P EST LOW 20 MIN: CPT | Performed by: NURSE PRACTITIONER

## 2024-06-04 RX ORDER — BUPROPION HYDROCHLORIDE 150 MG/1
150 TABLET ORAL
COMMUNITY
Start: 2024-05-30

## 2024-06-04 RX ORDER — CEPHALEXIN 500 MG/1
500 CAPSULE ORAL 4 TIMES DAILY
Qty: 28 CAPSULE | Refills: 0 | Status: SHIPPED | OUTPATIENT
Start: 2024-06-04 | End: 2024-06-11

## 2024-06-04 ASSESSMENT — ENCOUNTER SYMPTOMS
TROUBLE SWALLOWING: 0
VOICE CHANGE: 0
FACIAL SWELLING: 0
SORE THROAT: 0
SINUS PAIN: 0
RHINORRHEA: 0
SINUS PRESSURE: 0
VOMITING: 0
COUGH: 0
DIARRHEA: 0
ABDOMINAL PAIN: 0

## 2024-06-04 NOTE — PROGRESS NOTES
[facility] PHYSICIANS [facility], [facility] WALK-IN  [address]  [address]  [place] [facility] [place] [address]  Dept: [phone]  Dept Fax: [phone]    Palmer Huston is a 44 y.o. male who presents to the urgent care today for his medical conditions/complaints as notedbelow.  Palmer Huston is c/o of Ear Fullness (Right ear )      HPI:     44 yr old male presents for possible rt ear infection, pressure  Leaves for vacation in cpl days, driving. Will be in fairly remote area  No fevers, chills, uri sx  Suddenly started today and progressively worsening    Otalgia   There is pain in the right ear. This is a new problem. The current episode started today. The problem occurs constantly. The problem has been gradually worsening. There has been no fever. Pertinent negatives include no abdominal pain, coughing, diarrhea, ear discharge, headaches, hearing loss, neck pain, rash, rhinorrhea, sore throat or vomiting. He has tried nothing for the symptoms. The treatment provided no relief. There is no history of a chronic ear infection or hearing loss.       Past Medical History:   Diagnosis Date    Acute appendicitis with generalized peritonitis and abscess, without gangrene 01/07/2023    ADHD     Anxiety     Appendiceal abscess 01/07/2023    Depression     Tourette's         Current Outpatient Medications   Medication Sig Dispense Refill    buPROPion (WELLBUTRIN XL) 150 MG extended release tablet 1 tablet      cephALEXin (KEFLEX) 500 MG capsule Take 1 capsule by mouth 4 times daily for 7 days 28 capsule 0    albuterol sulfate HFA (PROVENTIL HFA) 108 (90 Base) MCG/ACT inhaler Inhale 2 puffs into the lungs every 6 hours as needed for Wheezing 18 g 3    haloperidol (HALDOL) 2 MG tablet Take 1 tablet by mouth 2 times daily      FLUoxetine (PROZAC) 20 MG capsule Take 1 capsule by mouth daily      atorvastatin (LIPITOR) 10 MG tablet Take 1 tablet by mouth daily      haloperidol (HALDOL) 1 MG

## 2024-06-14 DIAGNOSIS — E78.2 MIXED HYPERLIPIDEMIA: ICD-10-CM

## 2024-06-27 ENCOUNTER — OFFICE VISIT (OUTPATIENT)
Age: 44
End: 2024-06-27
Payer: COMMERCIAL

## 2024-06-27 ENCOUNTER — TELEPHONE (OUTPATIENT)
Age: 44
End: 2024-06-27

## 2024-06-27 VITALS
HEIGHT: 72 IN | DIASTOLIC BLOOD PRESSURE: 80 MMHG | TEMPERATURE: 97.2 F | OXYGEN SATURATION: 98 % | WEIGHT: 172 LBS | SYSTOLIC BLOOD PRESSURE: 116 MMHG | BODY MASS INDEX: 23.3 KG/M2 | HEART RATE: 70 BPM

## 2024-06-27 DIAGNOSIS — J45.20 MILD INTERMITTENT ASTHMA WITHOUT COMPLICATION: ICD-10-CM

## 2024-06-27 DIAGNOSIS — E78.2 MIXED HYPERLIPIDEMIA: ICD-10-CM

## 2024-06-27 DIAGNOSIS — Z86.59 HISTORY OF PANIC ATTACKS: ICD-10-CM

## 2024-06-27 DIAGNOSIS — F95.2 GILLES DE LA TOURETTE'S SYNDROME: ICD-10-CM

## 2024-06-27 DIAGNOSIS — E55.9 VITAMIN D DEFICIENCY: Primary | ICD-10-CM

## 2024-06-27 DIAGNOSIS — L70.9 ACNE, UNSPECIFIED ACNE TYPE: ICD-10-CM

## 2024-06-27 DIAGNOSIS — F90.0 ATTENTION DEFICIT HYPERACTIVITY DISORDER, PREDOMINANTLY INATTENTIVE TYPE: ICD-10-CM

## 2024-06-27 DIAGNOSIS — F33.41 RECURRENT MAJOR DEPRESSIVE DISORDER, IN PARTIAL REMISSION (HCC): ICD-10-CM

## 2024-06-27 PROCEDURE — 99214 OFFICE O/P EST MOD 30 MIN: CPT | Performed by: INTERNAL MEDICINE

## 2024-06-27 RX ORDER — CLINDAMYCIN PHOSPHATE 10 MG/G
GEL TOPICAL
Qty: 60 G | Refills: 1 | Status: SHIPPED | OUTPATIENT
Start: 2024-06-27 | End: 2024-07-04

## 2024-06-27 RX ORDER — CHOLECALCIFEROL (VITAMIN D3) 1250 MCG
CAPSULE ORAL
Qty: 12 CAPSULE | Refills: 0 | Status: SHIPPED | OUTPATIENT
Start: 2024-06-27

## 2024-06-27 SDOH — ECONOMIC STABILITY: FOOD INSECURITY: WITHIN THE PAST 12 MONTHS, YOU WORRIED THAT YOUR FOOD WOULD RUN OUT BEFORE YOU GOT MONEY TO BUY MORE.: NEVER TRUE

## 2024-06-27 SDOH — ECONOMIC STABILITY: FOOD INSECURITY: WITHIN THE PAST 12 MONTHS, THE FOOD YOU BOUGHT JUST DIDN'T LAST AND YOU DIDN'T HAVE MONEY TO GET MORE.: NEVER TRUE

## 2024-06-27 SDOH — ECONOMIC STABILITY: INCOME INSECURITY: HOW HARD IS IT FOR YOU TO PAY FOR THE VERY BASICS LIKE FOOD, HOUSING, MEDICAL CARE, AND HEATING?: NOT VERY HARD

## 2024-06-27 ASSESSMENT — ENCOUNTER SYMPTOMS
BLOOD IN STOOL: 0
EYE REDNESS: 0
VOMITING: 0
CONSTIPATION: 0
SORE THROAT: 0
RHINORRHEA: 0
BACK PAIN: 0
WHEEZING: 0
SINUS PRESSURE: 0
NAUSEA: 0
ABDOMINAL PAIN: 0
DIARRHEA: 0
EYE PAIN: 0
COUGH: 0
SHORTNESS OF BREATH: 0
SINUS PAIN: 0

## 2024-06-27 NOTE — TELEPHONE ENCOUNTER
Pharmacy was calling about clarification on Clindamycin to see how long he should be on this medication. Per Dr. Rolon, pharmacy was notified that he will be on this medication for 3 months.

## 2024-06-27 NOTE — PROGRESS NOTES
[facility]     Date of Visit:  2024  Patient Name: Palmer Huston   Patient :  1980     CHIEF COMPLAINT:     Palmer Huston is a 44 y.o. male who presents today for an general visit to be evaluated for the following condition(s):  Chief Complaint   Patient presents with    Follow-up     4 Months, new Patient    Discuss Labs       HISTORY OF PRESENT ILLNESS      Recently seen at urgent care for a suspected ear infection/ear pimple. Prescribed keflex but did not take it as symptoms resolved on their own. Last appointment with Dr. Mendoza was about a month ago. Taking haldol for tic symptoms, wondering about increasing dose. Says he is no longer depressed and the prozac is for sexual performance. Wellbutrin is for ADHD.   Current complaints of chronic acne that is painful. Has tried benzyl peroxide which didn't work so he is now trying salicylic acid but is frustrated with the lack of results.     REVIEW OF SYSTEMS     Review of Systems   Constitutional:  Negative for chills, fever and unexpected weight change.   HENT:  Negative for congestion, ear pain, hearing loss, rhinorrhea, sinus pressure, sinus pain, sneezing and sore throat.    Eyes:  Negative for pain, redness and visual disturbance.   Respiratory:  Negative for cough, shortness of breath and wheezing.    Cardiovascular:  Negative for chest pain, palpitations and leg swelling.   Gastrointestinal:  Negative for abdominal pain, blood in stool, constipation, diarrhea, nausea and vomiting.   Endocrine: Negative for cold intolerance and heat intolerance.   Genitourinary:  Negative for difficulty urinating, dysuria, frequency, hematuria and urgency.   Musculoskeletal:  Negative for arthralgias, back pain, gait problem, joint swelling, myalgias and neck pain.   Skin:  Negative for rash and wound.        Acne around the mouth    Allergic/Immunologic: Negative for immunocompromised state.   Neurological:  Negative for dizziness, tremors,

## 2024-07-25 DIAGNOSIS — E78.2 MIXED HYPERLIPIDEMIA: Primary | ICD-10-CM

## 2024-07-25 NOTE — TELEPHONE ENCOUNTER
[facility] is requesting a refill for atorvastatin 10mg daily and montelukast 10mg daily. Last prescriber is historical.

## 2024-07-26 RX ORDER — MONTELUKAST SODIUM 10 MG/1
10 TABLET ORAL NIGHTLY
Qty: 90 TABLET | Refills: 3 | Status: SHIPPED | OUTPATIENT
Start: 2024-07-26

## 2024-07-26 RX ORDER — ATORVASTATIN CALCIUM 10 MG/1
10 TABLET, FILM COATED ORAL EVERY MORNING
Qty: 90 TABLET | Refills: 3 | Status: SHIPPED | OUTPATIENT
Start: 2024-07-26

## 2024-08-21 ENCOUNTER — HOSPITAL ENCOUNTER (OUTPATIENT)
Age: 44
Setting detail: SPECIMEN
Discharge: HOME | End: 2024-08-21

## 2024-08-21 ENCOUNTER — OFFICE VISIT (OUTPATIENT)
Age: 44
End: 2024-08-21
Payer: COMMERCIAL

## 2024-08-21 VITALS
DIASTOLIC BLOOD PRESSURE: 80 MMHG | WEIGHT: 176 LBS | TEMPERATURE: 97.5 F | BODY MASS INDEX: 23.84 KG/M2 | HEIGHT: 72 IN | HEART RATE: 84 BPM | SYSTOLIC BLOOD PRESSURE: 140 MMHG | OXYGEN SATURATION: 97 %

## 2024-08-21 DIAGNOSIS — F95.2 GILLES DE LA TOURETTE'S SYNDROME: ICD-10-CM

## 2024-08-21 DIAGNOSIS — J45.20 MILD INTERMITTENT ASTHMA WITHOUT COMPLICATION: ICD-10-CM

## 2024-08-21 DIAGNOSIS — R07.9 CHEST PAIN, UNSPECIFIED TYPE: ICD-10-CM

## 2024-08-21 DIAGNOSIS — F90.0 ATTENTION DEFICIT HYPERACTIVITY DISORDER, PREDOMINANTLY INATTENTIVE TYPE: ICD-10-CM

## 2024-08-21 DIAGNOSIS — R55 SYNCOPE AND COLLAPSE: Primary | ICD-10-CM

## 2024-08-21 DIAGNOSIS — R07.89 OTHER CHEST PAIN: ICD-10-CM

## 2024-08-21 DIAGNOSIS — R55 SYNCOPE, UNSPECIFIED SYNCOPE TYPE: ICD-10-CM

## 2024-08-21 LAB — D DIMER PPP FEU-MCNC: <0.27 UG/ML FEU (ref 0–0.57)

## 2024-08-21 PROCEDURE — 93000 ELECTROCARDIOGRAM COMPLETE: CPT | Performed by: PHYSICIAN ASSISTANT

## 2024-08-21 PROCEDURE — 99214 OFFICE O/P EST MOD 30 MIN: CPT | Performed by: PHYSICIAN ASSISTANT

## 2024-08-21 ASSESSMENT — ENCOUNTER SYMPTOMS
EYE PAIN: 0
CONSTIPATION: 0
ABDOMINAL PAIN: 0
SINUS PAIN: 0
VOMITING: 0
SINUS PRESSURE: 0
RHINORRHEA: 0
SORE THROAT: 0
SHORTNESS OF BREATH: 0
EYE REDNESS: 0
COUGH: 0
NAUSEA: 0
WHEEZING: 0
BLOOD IN STOOL: 0
BACK PAIN: 0
DIARRHEA: 0

## 2024-08-21 NOTE — PROGRESS NOTES
12.5 - 15.4 %    Platelets 292 140 - 450 k/uL    MPV 7.2 6.0 - 12.0 fL   Basic Metabolic Panel w/ Reflex to MG   Result Value Ref Range    Glucose 111 (H) 70 - 99 mg/dL    BUN 10 6 - 20 mg/dL    Creatinine 0.98 0.70 - 1.20 mg/dL    Est, Glom Filt Rate >60 >60 mL/min/1.73m2    Calcium 8.5 (L) 8.6 - 10.4 mg/dL    Sodium 137 135 - 144 mmol/L    Potassium 3.8 3.7 - 5.3 mmol/L    Chloride 105 98 - 107 mmol/L    CO2 25 20 - 31 mmol/L    Anion Gap 7 (L) 9 - 17 mmol/L   CBC   Result Value Ref Range    WBC 8.9 3.5 - 11.0 k/uL    RBC 4.26 (L) 4.5 - 5.9 m/uL    Hemoglobin 12.8 (L) 13.5 - 17.5 g/dL    Hematocrit 37.2 (L) 41 - 53 %    MCV 87.3 80 - 100 fL    MCH 30.1 26 - 34 pg    MCHC 34.4 31 - 37 g/dL    RDW 13.6 12.5 - 15.4 %    Platelets 225 140 - 450 k/uL    MPV 7.2 6.0 - 12.0 fL   SURGICAL PATHOLOGY REPORT   Result Value Ref Range    Surgical Pathology Report       -- Diagnosis --  APPENDIX, APPENDECTOMY:  - ACUTE SUPPURATIVE APPENDICITIS WITH ACUTE  PERITONITIS/PERIAPPENDICEAL   ABSCESS.  - INTRALUMINAL FECALITH.    COMMENT: PER THE OPERATIVE NOTE, THE PATIENT WAS FOUND TO HAVE ACUTE  APPENDICITIS, GENERALIZED PERITONITIS AND ABSCESS BUT NO GROSS  PERFORATION.       Ronald Ahumada M.D.  **Electronically Signed Out**         sls/1/10/2023       Clinical Information  Pre-op Diagnosis:  ACUTE APPENDICITIS WITH GENERALIZED PERITONITIS AND  ABSCESS, WITHOUT GANGRENE, UNSPECIFIED WHETHER PERFORATION PRESENT   Operative Findings:  APPENDIX  Operation Performed:  LAPAROSCOPIC APPENDECTOMY, POSSIBLE BOWEL  RESECTION HEMICOLECTOMY LAPAROSCOPIC ROBOTIC     Source of Specimen  A: APPENDIX    Gross Description  \"MELINAKEYLA NARVAEZ, APPENDIX\" 6.0 cm in length x 1.7 cm in diameter  vermiform appendix.  No well-defined perforations or tears are grossly  identified.  The serosa is tan, hemorrhagic with adherent tan  fibrinopurulent exudate and mesoappendix.  Sect ioning reveals a  dilated lumen lined by a tan to red slightly granular

## 2024-08-26 ENCOUNTER — HOSPITAL ENCOUNTER (OUTPATIENT)
Dept: NEUROLOGY | Age: 44
Discharge: HOME | End: 2024-08-26
Attending: PHYSICIAN ASSISTANT
Payer: COMMERCIAL

## 2024-08-26 DIAGNOSIS — R55 SYNCOPE AND COLLAPSE: ICD-10-CM

## 2024-08-26 PROCEDURE — 95812 EEG 41-60 MINUTES: CPT | Performed by: PSYCHIATRY & NEUROLOGY

## 2024-08-26 PROCEDURE — 95816 EEG AWAKE AND DROWSY: CPT

## 2024-08-27 ENCOUNTER — HOSPITAL ENCOUNTER (OUTPATIENT)
Age: 44
Discharge: HOME | End: 2024-08-29
Attending: PHYSICIAN ASSISTANT
Payer: COMMERCIAL

## 2024-08-27 VITALS — HEIGHT: 72 IN | BODY MASS INDEX: 23.84 KG/M2 | WEIGHT: 176 LBS

## 2024-08-27 DIAGNOSIS — R07.9 CHEST PAIN, UNSPECIFIED TYPE: ICD-10-CM

## 2024-08-27 DIAGNOSIS — R07.89 OTHER CHEST PAIN: ICD-10-CM

## 2024-08-27 LAB
ECHO AO ROOT DIAM: 2.8 CM
ECHO AO ROOT INDEX: 1.39 CM/M2
ECHO AV AREA PEAK VELOCITY: 3.3 CM2
ECHO AV AREA VTI: 3.3 CM2
ECHO AV AREA/BSA PEAK VELOCITY: 1.6 CM2/M2
ECHO AV AREA/BSA VTI: 1.6 CM2/M2
ECHO AV MEAN GRADIENT: 3 MMHG
ECHO AV MEAN VELOCITY: 0.8 M/S
ECHO AV PEAK GRADIENT: 5 MMHG
ECHO AV PEAK VELOCITY: 1.1 M/S
ECHO AV VELOCITY RATIO: 0.91
ECHO AV VTI: 23.8 CM
ECHO BSA: 2.01 M2
ECHO IVC EXP: 2.1 CM
ECHO IVC INSP: 1 CM
ECHO LA AREA 2C: 15.7 CM2
ECHO LA AREA 4C: 15.2 CM2
ECHO LA DIAMETER INDEX: 1.74 CM/M2
ECHO LA DIAMETER: 3.5 CM
ECHO LA MAJOR AXIS: 5.1 CM
ECHO LA MINOR AXIS: 4.9 CM
ECHO LA TO AORTIC ROOT RATIO: 1.25
ECHO LA VOL BP: 38 ML (ref 18–58)
ECHO LA VOL MOD A2C: 40 ML (ref 18–58)
ECHO LA VOL MOD A4C: 35 ML (ref 18–58)
ECHO LA VOL/BSA BIPLANE: 19 ML/M2 (ref 16–34)
ECHO LA VOLUME INDEX MOD A2C: 20 ML/M2 (ref 16–34)
ECHO LA VOLUME INDEX MOD A4C: 17 ML/M2 (ref 16–34)
ECHO LV E' LATERAL VELOCITY: 13 CM/S
ECHO LV E' SEPTAL VELOCITY: 8 CM/S
ECHO LV EF PHYSICIAN: 60 %
ECHO LV FRACTIONAL SHORTENING: 38 % (ref 28–44)
ECHO LV INTERNAL DIMENSION DIASTOLE INDEX: 2.24 CM/M2
ECHO LV INTERNAL DIMENSION DIASTOLIC: 4.5 CM (ref 4.2–5.9)
ECHO LV INTERNAL DIMENSION SYSTOLIC INDEX: 1.39 CM/M2
ECHO LV INTERNAL DIMENSION SYSTOLIC: 2.8 CM
ECHO LV IVSD: 0.9 CM (ref 0.6–1)
ECHO LV MASS 2D: 132.8 G (ref 88–224)
ECHO LV MASS INDEX 2D: 66.1 G/M2 (ref 49–115)
ECHO LV POSTERIOR WALL DIASTOLIC: 0.9 CM (ref 0.6–1)
ECHO LV RELATIVE WALL THICKNESS RATIO: 0.4
ECHO LVOT AREA: 3.8 CM2
ECHO LVOT AV VTI INDEX: 0.87
ECHO LVOT DIAM: 2.2 CM
ECHO LVOT MEAN GRADIENT: 2 MMHG
ECHO LVOT PEAK GRADIENT: 4 MMHG
ECHO LVOT PEAK VELOCITY: 1 M/S
ECHO LVOT STROKE VOLUME INDEX: 38.9 ML/M2
ECHO LVOT SV: 78.3 ML
ECHO LVOT VTI: 20.6 CM
ECHO MV A VELOCITY: 0.4 M/S
ECHO MV E DECELERATION TIME (DT): 190 MS
ECHO MV E VELOCITY: 0.67 M/S
ECHO MV E/A RATIO: 1.68
ECHO MV E/E' LATERAL: 5.15
ECHO MV E/E' RATIO (AVERAGED): 6.76
ECHO MV E/E' SEPTAL: 8.38
ECHO RV BASAL DIMENSION: 3.4 CM
ECHO RV FREE WALL PEAK S': 12 CM/S

## 2024-08-27 PROCEDURE — 93306 TTE W/DOPPLER COMPLETE: CPT

## 2024-08-27 PROCEDURE — 93306 TTE W/DOPPLER COMPLETE: CPT | Performed by: INTERNAL MEDICINE

## 2024-08-27 NOTE — PROCEDURES
EEG REPORT       Patient: Palmer Huston Age: 44 y.o.  MRN: [id number]      Referring Provider: Derek Rolon MD  Attending Physician:  Jessica Adhikari PA-C      History: This is a routine scalp EEG recorded with time-locked video monitoring.  Patient is being evaluated for syncopal episode.      This EEG was performed to evaluate for focal and epileptiform abnormalities.       Palmer Huston   Current Outpatient Medications   Medication Sig Dispense Refill    atorvastatin (LIPITOR) 10 MG tablet TAKE ONE TABLET BY MOUTH IN THE MORNING 90 tablet 3    montelukast (SINGULAIR) 10 MG tablet Take one tablet by mouth every evening 90 tablet 3    Cholecalciferol (VITAMIN D3) 1.25 MG (95587 UT) CAPS 1 capsule once a week x 6 weeks then once a month 12 capsule 0    buPROPion (WELLBUTRIN XL) 150 MG extended release tablet 1 tablet      albuterol sulfate HFA (PROVENTIL HFA) 108 (90 Base) MCG/ACT inhaler Inhale 2 puffs into the lungs every 6 hours as needed for Wheezing 18 g 3    haloperidol (HALDOL) 2 MG tablet Take 1 tablet by mouth 2 times daily      FLUoxetine (PROZAC) 20 MG capsule Take 1 capsule by mouth daily      haloperidol (HALDOL) 1 MG tablet Take 1 tablet by mouth 2 times daily Medication verified @ [facility] on 12/29/17.       No current facility-administered medications for this encounter.        Technical Description: This is a 22 channel EEG and 1 channel of EKG performed with time-locked video. Electrodes were placed utilizing the International 10-20 system of placement.     The patient was not sleep deprived. This recording was obtained during wakefulness. EEG done on 8/26/2024     EEG Description: The dominant background activity during maximal recorded wakefulness consisted of bioccipitally dominant 9-10 Hz, 25-35 uV symmetric, regular activity that was reactive to eye opening. Also noted normal anterior posterior gradient with low amplitude high-frequency beta activity in anterior head regions.  As the

## 2024-08-30 ENCOUNTER — HOSPITAL ENCOUNTER (OUTPATIENT)
Age: 44
Discharge: HOME | End: 2024-08-30
Attending: PHYSICIAN ASSISTANT
Payer: COMMERCIAL

## 2024-08-30 DIAGNOSIS — R55 SYNCOPE, UNSPECIFIED SYNCOPE TYPE: ICD-10-CM

## 2024-08-30 DIAGNOSIS — R55 SYNCOPE AND COLLAPSE: ICD-10-CM

## 2024-08-30 DIAGNOSIS — R07.89 OTHER CHEST PAIN: ICD-10-CM

## 2024-08-30 PROCEDURE — 93270 REMOTE 30 DAY ECG REV/REPORT: CPT

## 2024-09-03 ENCOUNTER — HOSPITAL ENCOUNTER (OUTPATIENT)
Dept: VASCULAR LAB | Age: 44
Discharge: HOME | End: 2024-09-05
Attending: PHYSICIAN ASSISTANT
Payer: COMMERCIAL

## 2024-09-03 DIAGNOSIS — R55 SYNCOPE AND COLLAPSE: ICD-10-CM

## 2024-09-03 PROCEDURE — 93880 EXTRACRANIAL BILAT STUDY: CPT

## 2024-09-04 LAB
VAS LEFT BULB EDV: 25.7 CM/S
VAS LEFT BULB PSV: 101.8 CM/S
VAS LEFT CCA DIST EDV: 19.9 CM/S
VAS LEFT CCA DIST PSV: 96.6 CM/S
VAS LEFT CCA PROX EDV: 23.6 CM/S
VAS LEFT CCA PROX PSV: 120.4 CM/S
VAS LEFT ECA EDV: 12.2 CM/S
VAS LEFT ECA PSV: 73.5 CM/S
VAS LEFT ICA DIST EDV: 24.1 CM/S
VAS LEFT ICA DIST PSV: 58.1 CM/S
VAS LEFT ICA PROX EDV: 17.1 CM/S
VAS LEFT ICA PROX PSV: 77.2 CM/S
VAS LEFT ICA/CCA PSV: 1.1 NO UNITS
VAS LEFT VERTEBRAL EDV: 19.7 CM/S
VAS LEFT VERTEBRAL PSV: 57 CM/S
VAS RIGHT BULB EDV: 17.1 CM/S
VAS RIGHT BULB PSV: 67.4 CM/S
VAS RIGHT CCA DIST EDV: 18.1 CM/S
VAS RIGHT CCA DIST PSV: 87.4 CM/S
VAS RIGHT CCA PROX EDV: 21.7 CM/S
VAS RIGHT CCA PROX PSV: 113.1 CM/S
VAS RIGHT ECA EDV: 13.4 CM/S
VAS RIGHT ECA PSV: 94.4 CM/S
VAS RIGHT ICA DIST EDV: 25.6 CM/S
VAS RIGHT ICA DIST PSV: 60 CM/S
VAS RIGHT ICA PROX EDV: 18.3 CM/S
VAS RIGHT ICA PROX PSV: 87 CM/S
VAS RIGHT ICA/CCA PSV: 1 NO UNITS
VAS RIGHT VERTEBRAL EDV: 12.1 CM/S
VAS RIGHT VERTEBRAL PSV: 51.4 CM/S

## 2024-09-04 PROCEDURE — 93880 EXTRACRANIAL BILAT STUDY: CPT | Performed by: STUDENT IN AN ORGANIZED HEALTH CARE EDUCATION/TRAINING PROGRAM

## 2024-09-30 DIAGNOSIS — E55.9 VITAMIN D DEFICIENCY: ICD-10-CM

## 2024-10-09 DIAGNOSIS — I47.20 VENTRICULAR TACHYCARDIA (HCC): Primary | ICD-10-CM

## 2024-10-09 DIAGNOSIS — R55 SYNCOPE AND COLLAPSE: ICD-10-CM

## 2024-11-01 ENCOUNTER — HOSPITAL ENCOUNTER (OUTPATIENT)
Age: 44
Discharge: HOME | End: 2024-11-01
Payer: COMMERCIAL

## 2024-11-01 LAB
CORTIS SERPL-MCNC: 9.6 UG/DL (ref 2.5–19.5)
CORTISOL COLLECTION INFO: NORMAL
TSH SERPL DL<=0.05 MIU/L-ACNC: 2.1 UIU/ML (ref 0.27–4.2)

## 2024-11-01 PROCEDURE — 82533 TOTAL CORTISOL: CPT

## 2024-11-01 PROCEDURE — 36415 COLL VENOUS BLD VENIPUNCTURE: CPT

## 2024-11-01 PROCEDURE — 84443 ASSAY THYROID STIM HORMONE: CPT

## 2025-01-03 ENCOUNTER — OFFICE VISIT (OUTPATIENT)
Age: 45
End: 2025-01-03

## 2025-01-03 VITALS
BODY MASS INDEX: 23.98 KG/M2 | SYSTOLIC BLOOD PRESSURE: 136 MMHG | WEIGHT: 177 LBS | TEMPERATURE: 97.4 F | OXYGEN SATURATION: 96 % | HEIGHT: 72 IN | HEART RATE: 77 BPM | DIASTOLIC BLOOD PRESSURE: 82 MMHG

## 2025-01-03 DIAGNOSIS — F90.0 ATTENTION DEFICIT HYPERACTIVITY DISORDER, PREDOMINANTLY INATTENTIVE TYPE: ICD-10-CM

## 2025-01-03 DIAGNOSIS — R55 SYNCOPE AND COLLAPSE: Primary | ICD-10-CM

## 2025-01-03 DIAGNOSIS — F95.2 GILLES DE LA TOURETTE'S SYNDROME: ICD-10-CM

## 2025-01-03 DIAGNOSIS — Z12.83 SKIN CANCER SCREENING: ICD-10-CM

## 2025-01-03 DIAGNOSIS — E78.2 MIXED HYPERLIPIDEMIA: ICD-10-CM

## 2025-01-03 DIAGNOSIS — I47.29 NONSUSTAINED VENTRICULAR TACHYCARDIA (HCC): ICD-10-CM

## 2025-01-03 RX ORDER — METHYLPHENIDATE HYDROCHLORIDE 40 MG/1
CAPSULE ORAL
COMMUNITY

## 2025-01-03 ASSESSMENT — ENCOUNTER SYMPTOMS
ABDOMINAL PAIN: 0
EYE REDNESS: 0
COUGH: 0
NAUSEA: 0
SINUS PRESSURE: 0
SINUS PAIN: 0
EYE PAIN: 0
WHEEZING: 0
SHORTNESS OF BREATH: 1
RHINORRHEA: 0
BACK PAIN: 0
BLOOD IN STOOL: 0
VOMITING: 0
SORE THROAT: 0
CONSTIPATION: 0
DIARRHEA: 0

## 2025-01-03 NOTE — PROGRESS NOTES
distension.      Palpations: Abdomen is soft. There is no mass.      Tenderness: There is no abdominal tenderness. There is no guarding.   Musculoskeletal:         General: No swelling or deformity. Normal range of motion.      Cervical back: Normal range of motion and neck supple. No rigidity.   Lymphadenopathy:      Cervical: No cervical adenopathy.   Skin:     General: Skin is warm.      Coloration: Skin is not jaundiced or pale.      Findings: Lesion (patient with multiple freckles/moles on back. left shoulder with slightly raised flesh colored lesion with hair coming out of it that has been present for years.) present. No bruising or rash.   Neurological:      General: No focal deficit present.      Mental Status: He is alert and oriented to person, place, and time.      Cranial Nerves: No cranial nerve deficit.      Motor: No weakness.      Gait: Gait normal.      Deep Tendon Reflexes: Reflexes normal.   Psychiatric:         Mood and Affect: Mood normal.         Thought Content: Thought content normal.         The ASCVD Risk score (Luis Miguel FRAZIER, et al., 2019) failed to calculate for the following reasons:    Cannot find a previous HDL lab    Cannot find a previous total cholesterol lab     Results for orders placed or performed during the hospital encounter of 11/01/24   Cortisol Total   Result Value Ref Range    Cortisol 9.6 2.5 - 19.5 ug/dL    Cortisol Collection Info AM    TSH   Result Value Ref Range    TSH 2.10 0.27 - 4.20 uIU/mL      ASSESSMENT/PLAN     1. Syncope and collapse  -     External Referral To Cardiology  Discussed with patient that he should probably have a second opinion with an electrophysiologist regarding the episode of syncope accompanied by chest pain that he had August 2024 and a follow-up event monitor that did indicate a 5 run nonsustained V. tach and the fact that patient has been on Adderall and now methylphenidate which can be associated with arrhythmias and sudden cardiac death.  I

## 2025-01-20 ENCOUNTER — OFFICE VISIT (OUTPATIENT)
Age: 45
End: 2025-01-20
Payer: COMMERCIAL

## 2025-01-20 VITALS
SYSTOLIC BLOOD PRESSURE: 116 MMHG | HEIGHT: 72 IN | TEMPERATURE: 97.7 F | OXYGEN SATURATION: 97 % | BODY MASS INDEX: 24.34 KG/M2 | HEART RATE: 78 BPM | DIASTOLIC BLOOD PRESSURE: 80 MMHG

## 2025-01-20 DIAGNOSIS — J01.00 SUBACUTE MAXILLARY SINUSITIS: Primary | ICD-10-CM

## 2025-01-20 DIAGNOSIS — J45.20 MILD INTERMITTENT ASTHMA WITHOUT COMPLICATION: ICD-10-CM

## 2025-01-20 PROCEDURE — 87880 STREP A ASSAY W/OPTIC: CPT | Performed by: INTERNAL MEDICINE

## 2025-01-20 PROCEDURE — G8420 CALC BMI NORM PARAMETERS: HCPCS | Performed by: INTERNAL MEDICINE

## 2025-01-20 PROCEDURE — G8427 DOCREV CUR MEDS BY ELIG CLIN: HCPCS | Performed by: INTERNAL MEDICINE

## 2025-01-20 PROCEDURE — 99213 OFFICE O/P EST LOW 20 MIN: CPT | Performed by: INTERNAL MEDICINE

## 2025-01-20 PROCEDURE — 1036F TOBACCO NON-USER: CPT | Performed by: INTERNAL MEDICINE

## 2025-01-20 RX ORDER — AZITHROMYCIN 500 MG/1
500 TABLET, FILM COATED ORAL DAILY
Qty: 3 TABLET | Refills: 0 | Status: SHIPPED | OUTPATIENT
Start: 2025-01-20 | End: 2025-01-23

## 2025-01-20 RX ORDER — ACETAMINOPHEN 325 MG/1
325 TABLET ORAL EVERY 4 HOURS PRN
COMMUNITY
Start: 2024-08-28

## 2025-01-20 SDOH — ECONOMIC STABILITY: FOOD INSECURITY: WITHIN THE PAST 12 MONTHS, THE FOOD YOU BOUGHT JUST DIDN'T LAST AND YOU DIDN'T HAVE MONEY TO GET MORE.: NEVER TRUE

## 2025-01-20 SDOH — ECONOMIC STABILITY: FOOD INSECURITY: WITHIN THE PAST 12 MONTHS, YOU WORRIED THAT YOUR FOOD WOULD RUN OUT BEFORE YOU GOT MONEY TO BUY MORE.: NEVER TRUE

## 2025-01-20 ASSESSMENT — ENCOUNTER SYMPTOMS
SINUS PRESSURE: 0
NAUSEA: 0
COUGH: 0
SINUS PAIN: 0
RHINORRHEA: 0
EYE PAIN: 0
DIARRHEA: 0
BLOOD IN STOOL: 0
BACK PAIN: 0
SORE THROAT: 0
WHEEZING: 0
SHORTNESS OF BREATH: 0
CONSTIPATION: 0
ABDOMINAL PAIN: 0
EYE REDNESS: 0
VOMITING: 0

## 2025-01-20 ASSESSMENT — PATIENT HEALTH QUESTIONNAIRE - PHQ9
3. TROUBLE FALLING OR STAYING ASLEEP: NOT AT ALL
SUM OF ALL RESPONSES TO PHQ QUESTIONS 1-9: 0
1. LITTLE INTEREST OR PLEASURE IN DOING THINGS: NOT AT ALL
SUM OF ALL RESPONSES TO PHQ9 QUESTIONS 1 & 2: 0
9. THOUGHTS THAT YOU WOULD BE BETTER OFF DEAD, OR OF HURTING YOURSELF: NOT AT ALL
8. MOVING OR SPEAKING SO SLOWLY THAT OTHER PEOPLE COULD HAVE NOTICED. OR THE OPPOSITE, BEING SO FIGETY OR RESTLESS THAT YOU HAVE BEEN MOVING AROUND A LOT MORE THAN USUAL: NOT AT ALL
6. FEELING BAD ABOUT YOURSELF - OR THAT YOU ARE A FAILURE OR HAVE LET YOURSELF OR YOUR FAMILY DOWN: NOT AT ALL
5. POOR APPETITE OR OVEREATING: NOT AT ALL
4. FEELING TIRED OR HAVING LITTLE ENERGY: NOT AT ALL
10. IF YOU CHECKED OFF ANY PROBLEMS, HOW DIFFICULT HAVE THESE PROBLEMS MADE IT FOR YOU TO DO YOUR WORK, TAKE CARE OF THINGS AT HOME, OR GET ALONG WITH OTHER PEOPLE: NOT DIFFICULT AT ALL
SUM OF ALL RESPONSES TO PHQ QUESTIONS 1-9: 0
SUM OF ALL RESPONSES TO PHQ QUESTIONS 1-9: 0
7. TROUBLE CONCENTRATING ON THINGS, SUCH AS READING THE NEWSPAPER OR WATCHING TELEVISION: NOT AT ALL
2. FEELING DOWN, DEPRESSED OR HOPELESS: NOT AT ALL
SUM OF ALL RESPONSES TO PHQ QUESTIONS 1-9: 0

## 2025-01-20 NOTE — PROGRESS NOTES
calculate for the following reasons:    Cannot find a previous HDL lab    Cannot find a previous total cholesterol lab     Results for orders placed or performed during the hospital encounter of 11/01/24   Cortisol Total   Result Value Ref Range    Cortisol 9.6 2.5 - 19.5 ug/dL    Cortisol Collection Info AM    TSH   Result Value Ref Range    TSH 2.10 0.27 - 4.20 uIU/mL      ASSESSMENT/PLAN           No follow-ups on file.  Subacute maxillary sinusitis  Zpak and flonase erxed. Strep neg. Home covid neg. Wife, daughter and dad all on AB    Asthma  Stable. Albuterol inhaler prn    Keep f/u appt.     COMMUNICATION:       Electronically signed by Derek Rolon MD on 1/20/2025 at 12:46 PM

## 2025-02-10 ENCOUNTER — OFFICE VISIT (OUTPATIENT)
Age: 45
End: 2025-02-10
Payer: COMMERCIAL

## 2025-02-10 VITALS
OXYGEN SATURATION: 95 % | HEIGHT: 72 IN | BODY MASS INDEX: 24.65 KG/M2 | HEART RATE: 83 BPM | WEIGHT: 182 LBS | TEMPERATURE: 97 F

## 2025-02-10 DIAGNOSIS — R53.83 OTHER FATIGUE: ICD-10-CM

## 2025-02-10 DIAGNOSIS — F33.41 RECURRENT MAJOR DEPRESSIVE DISORDER, IN PARTIAL REMISSION (HCC): ICD-10-CM

## 2025-02-10 DIAGNOSIS — R06.83 SNORING: Primary | ICD-10-CM

## 2025-02-10 DIAGNOSIS — F95.2 GILLES DE LA TOURETTE'S SYNDROME: ICD-10-CM

## 2025-02-10 DIAGNOSIS — R55 SYNCOPE AND COLLAPSE: ICD-10-CM

## 2025-02-10 DIAGNOSIS — I47.29 NONSUSTAINED VENTRICULAR TACHYCARDIA (HCC): ICD-10-CM

## 2025-02-10 PROCEDURE — G8419 CALC BMI OUT NRM PARAM NOF/U: HCPCS | Performed by: PHYSICIAN ASSISTANT

## 2025-02-10 PROCEDURE — 1036F TOBACCO NON-USER: CPT | Performed by: PHYSICIAN ASSISTANT

## 2025-02-10 PROCEDURE — 99213 OFFICE O/P EST LOW 20 MIN: CPT | Performed by: PHYSICIAN ASSISTANT

## 2025-02-10 PROCEDURE — G8427 DOCREV CUR MEDS BY ELIG CLIN: HCPCS | Performed by: PHYSICIAN ASSISTANT

## 2025-02-10 ASSESSMENT — ENCOUNTER SYMPTOMS
NAUSEA: 0
WHEEZING: 0
SHORTNESS OF BREATH: 0
BLOOD IN STOOL: 0
COUGH: 0
SINUS PRESSURE: 0
BACK PAIN: 0
VOMITING: 0
ABDOMINAL PAIN: 0
DIARRHEA: 0
SORE THROAT: 0
CONSTIPATION: 0
RHINORRHEA: 0
EYE REDNESS: 0
EYE PAIN: 0
SINUS PAIN: 0

## 2025-02-10 NOTE — PROGRESS NOTES
Procedure Laterality Date    CYST REMOVAL      From the nasal septum    LAPAROSCOPIC APPENDECTOMY N/A 01/07/2023    APPENDECTOMY LAPAROSCOPIC ROBOTIC performed by Jl Leija DO at [facility] OR    UMBILICAL HERNIA REPAIR  01/2023    Dr. Leija        Social History     Socioeconomic History    Marital status:      Spouse name: None    Number of children: None    Years of education: None    Highest education level: None   Tobacco Use    Smoking status: Never    Smokeless tobacco: Never   Substance and Sexual Activity    Alcohol use: Yes     Comment: socially    Drug use: No     Social Determinants of Health     Financial Resource Strain: Low Risk  (6/27/2024)    Overall Financial Resource Strain (CARDIA)     Difficulty of Paying Living Expenses: Not very hard   Food Insecurity: No Food Insecurity (1/20/2025)    Hunger Vital Sign     Worried About Running Out of Food in the Last Year: Never true     Ran Out of Food in the Last Year: Never true   Transportation Needs: No Transportation Needs (1/20/2025)    PRAPARE - Transportation     Lack of Transportation (Medical): No     Lack of Transportation (Non-Medical): No   Physical Activity: Inactive (2/21/2024)    Exercise Vital Sign     Days of Exercise per Week: 0 days     Minutes of Exercise per Session: 0 min    Received from [facility]    Social Connections   Housing Stability: Low Risk  (1/20/2025)    Housing Stability Vital Sign     Unable to Pay for Housing in the Last Year: No     Number of Times Moved in the Last Year: 0     Homeless in the Last Year: No        Family History   Problem Relation Age of Onset    Breast Cancer Mother 65    Liver Cancer Mother     Coronary Art Dis Father 75    Cancer Father         Skin    Cancer Maternal Grandmother 60        hodgkins    Heart Attack Maternal Grandfather     Leukemia Paternal Grandmother     Heart Attack Paternal Grandfather     Schizophrenia Other         Schizoaffective, cousin        PHYSICAL

## 2025-02-15 ENCOUNTER — HOSPITAL ENCOUNTER (OUTPATIENT)
Dept: SLEEP CENTER | Age: 45
Discharge: HOME | End: 2025-02-17
Payer: COMMERCIAL

## 2025-02-15 VITALS — BODY MASS INDEX: 25.62 KG/M2 | HEIGHT: 70 IN | WEIGHT: 179 LBS

## 2025-02-15 PROCEDURE — 95811 POLYSOM 6/>YRS CPAP 4/> PARM: CPT

## 2025-02-15 PROCEDURE — 95810 POLYSOM 6/> YRS 4/> PARAM: CPT

## 2025-03-04 ENCOUNTER — OFFICE VISIT (OUTPATIENT)
Age: 45
End: 2025-03-04
Payer: COMMERCIAL

## 2025-03-04 VITALS
BODY MASS INDEX: 26.05 KG/M2 | WEIGHT: 182 LBS | HEART RATE: 79 BPM | OXYGEN SATURATION: 96 % | HEIGHT: 70 IN | TEMPERATURE: 96.9 F | DIASTOLIC BLOOD PRESSURE: 72 MMHG | SYSTOLIC BLOOD PRESSURE: 118 MMHG

## 2025-03-04 DIAGNOSIS — R55 SYNCOPE AND COLLAPSE: ICD-10-CM

## 2025-03-04 DIAGNOSIS — F95.2 GILLES DE LA TOURETTE'S SYNDROME: ICD-10-CM

## 2025-03-04 DIAGNOSIS — F33.41 RECURRENT MAJOR DEPRESSIVE DISORDER, IN PARTIAL REMISSION: ICD-10-CM

## 2025-03-04 DIAGNOSIS — I47.29 NONSUSTAINED VENTRICULAR TACHYCARDIA (HCC): ICD-10-CM

## 2025-03-04 DIAGNOSIS — G25.81 RESTLESS LEG SYNDROME: Primary | ICD-10-CM

## 2025-03-04 PROCEDURE — G8419 CALC BMI OUT NRM PARAM NOF/U: HCPCS | Performed by: PHYSICIAN ASSISTANT

## 2025-03-04 PROCEDURE — G8427 DOCREV CUR MEDS BY ELIG CLIN: HCPCS | Performed by: PHYSICIAN ASSISTANT

## 2025-03-04 PROCEDURE — 99213 OFFICE O/P EST LOW 20 MIN: CPT | Performed by: PHYSICIAN ASSISTANT

## 2025-03-04 PROCEDURE — 1036F TOBACCO NON-USER: CPT | Performed by: PHYSICIAN ASSISTANT

## 2025-03-04 RX ORDER — ROPINIROLE 2 MG/1
2 TABLET, FILM COATED, EXTENDED RELEASE ORAL NIGHTLY
Qty: 30 TABLET | Refills: 3 | Status: SHIPPED | OUTPATIENT
Start: 2025-03-04

## 2025-03-04 ASSESSMENT — ENCOUNTER SYMPTOMS
CONSTIPATION: 0
SHORTNESS OF BREATH: 0
EYE REDNESS: 0
SINUS PAIN: 0
VOMITING: 0
SORE THROAT: 0
BACK PAIN: 0
ABDOMINAL PAIN: 0
DIARRHEA: 0
SINUS PRESSURE: 0
WHEEZING: 0
COUGH: 0
RHINORRHEA: 0
EYE PAIN: 0
BLOOD IN STOOL: 0
NAUSEA: 0

## 2025-03-04 NOTE — PROGRESS NOTES
Cannot find a previous HDL lab    Cannot find a previous total cholesterol lab     Results for orders placed or performed during the hospital encounter of 11/01/24   Cortisol Total   Result Value Ref Range    Cortisol 9.6 2.5 - 19.5 ug/dL    Cortisol Collection Info AM    TSH   Result Value Ref Range    TSH 2.10 0.27 - 4.20 uIU/mL      ASSESSMENT/PLAN     1. Restless leg syndrome  -     rOPINIRole (REQUIP XL) 2 MG extended release tablet; Take 1 tablet by mouth nightly, Disp-30 tablet, R-3Normal  Sleep study did not find any significant obstructive sleep apnea however did indicate over 100 episodes of leg movements during his sleep study test.  Discussed with Dr. Rolon who suggested we start patient on ropinirole.  Discussed with patient that he should double check with his psychiatrist Dr. Mendoza prior to starting the medication to make sure that it will not exacerbate his Tourette's or interfere with his Haldol  2. Ger de la Tourette's syndrome  Keep follow-up with his psychiatrist.  He is on Haldol 2 mg twice a day  3. Recurrent major depressive disorder, in partial remission  Also monitored by his psychiatrist and being treated with Prozac 20 mg daily  4. Nonsustained ventricular tachycardia (HCC)  Patient has appointment with cardiologist this week, Dr. Solares  5. Syncope and collapse  Episode in August.  He had a workup which included event monitor.  He has appointment with cardiology tomorrow for a second opinion.  He did see one cardiologist previously.    Return for keep upcoming appointment.    COMMUNICATION:       Electronically signed by Jessica Adhikari PA-C on 3/4/2025 at 3:01 PM

## 2025-06-09 ENCOUNTER — OFFICE VISIT (OUTPATIENT)
Age: 45
End: 2025-06-09
Payer: COMMERCIAL

## 2025-06-09 VITALS
HEIGHT: 70 IN | TEMPERATURE: 97 F | WEIGHT: 182 LBS | OXYGEN SATURATION: 97 % | DIASTOLIC BLOOD PRESSURE: 80 MMHG | BODY MASS INDEX: 26.05 KG/M2 | SYSTOLIC BLOOD PRESSURE: 110 MMHG | HEART RATE: 73 BPM

## 2025-06-09 DIAGNOSIS — J45.20 MILD INTERMITTENT ASTHMA WITHOUT COMPLICATION: ICD-10-CM

## 2025-06-09 DIAGNOSIS — E55.9 VITAMIN D DEFICIENCY: ICD-10-CM

## 2025-06-09 DIAGNOSIS — E78.2 MIXED HYPERLIPIDEMIA: ICD-10-CM

## 2025-06-09 DIAGNOSIS — J01.10 ACUTE NON-RECURRENT FRONTAL SINUSITIS: Primary | ICD-10-CM

## 2025-06-09 DIAGNOSIS — F95.2 GILLES DE LA TOURETTE'S SYNDROME: ICD-10-CM

## 2025-06-09 LAB — S PYO AG THROAT QL: NORMAL

## 2025-06-09 PROCEDURE — 1036F TOBACCO NON-USER: CPT | Performed by: PHYSICIAN ASSISTANT

## 2025-06-09 PROCEDURE — 99213 OFFICE O/P EST LOW 20 MIN: CPT | Performed by: PHYSICIAN ASSISTANT

## 2025-06-09 PROCEDURE — 87880 STREP A ASSAY W/OPTIC: CPT | Performed by: PHYSICIAN ASSISTANT

## 2025-06-09 PROCEDURE — G8419 CALC BMI OUT NRM PARAM NOF/U: HCPCS | Performed by: PHYSICIAN ASSISTANT

## 2025-06-09 PROCEDURE — G8427 DOCREV CUR MEDS BY ELIG CLIN: HCPCS | Performed by: PHYSICIAN ASSISTANT

## 2025-06-09 RX ORDER — METHYLPHENIDATE HYDROCHLORIDE 40 MG/1
CAPSULE ORAL
COMMUNITY

## 2025-06-09 RX ORDER — AZITHROMYCIN 250 MG/1
250 TABLET, FILM COATED ORAL SEE ADMIN INSTRUCTIONS
Qty: 6 TABLET | Refills: 0 | Status: SHIPPED
Start: 2025-06-09 | End: 2025-06-09

## 2025-06-09 RX ORDER — FLUOXETINE HYDROCHLORIDE 40 MG/1
40 CAPSULE ORAL DAILY
COMMUNITY

## 2025-06-09 RX ORDER — HALOPERIDOL 10 MG/1
10 TABLET ORAL DAILY
COMMUNITY

## 2025-06-09 ASSESSMENT — ENCOUNTER SYMPTOMS
SHORTNESS OF BREATH: 0
NAUSEA: 0
COUGH: 1
WHEEZING: 0
SINUS PAIN: 1
BACK PAIN: 0
DIARRHEA: 0
ABDOMINAL PAIN: 0
SORE THROAT: 1
VOMITING: 0
CONSTIPATION: 0
SINUS PRESSURE: 1
EYE PAIN: 0
RHINORRHEA: 1
EYE REDNESS: 0
BLOOD IN STOOL: 0

## 2025-07-01 DIAGNOSIS — J45.20 MILD INTERMITTENT ASTHMA WITHOUT COMPLICATION: Primary | ICD-10-CM

## 2025-07-01 RX ORDER — MONTELUKAST SODIUM 10 MG/1
10 TABLET ORAL NIGHTLY
Qty: 90 TABLET | Refills: 3 | Status: SHIPPED | OUTPATIENT
Start: 2025-07-01

## 2025-07-01 NOTE — TELEPHONE ENCOUNTER
Please refill Montelukast 10 mg. Takes once a day. 3 Month supply     Please send to [facility] in [place].

## 2025-07-30 DIAGNOSIS — E78.2 MIXED HYPERLIPIDEMIA: ICD-10-CM

## 2025-07-30 RX ORDER — ATORVASTATIN CALCIUM 10 MG/1
10 TABLET, FILM COATED ORAL EVERY MORNING
Qty: 90 TABLET | Refills: 3 | Status: SHIPPED | OUTPATIENT
Start: 2025-07-30

## 2025-07-30 NOTE — TELEPHONE ENCOUNTER
Palmer Huston is calling to request a refill on the following medication(s):    Last Visit Date (If Applicable):  6/9/2025    Next Visit Date:    Visit date not found    Medication Request:  Requested Prescriptions     Pending Prescriptions Disp Refills    atorvastatin (LIPITOR) 10 MG tablet [Pharmacy Med Name: Atorvastatin Calcium Oral Tablet 10 MG] 90 tablet 3     Sig: Take 1 tablet by mouth every morning

## 2025-07-31 ENCOUNTER — HOSPITAL ENCOUNTER (OUTPATIENT)
Age: 45
Discharge: HOME | End: 2025-07-31
Payer: COMMERCIAL

## 2025-07-31 DIAGNOSIS — E55.9 VITAMIN D DEFICIENCY: ICD-10-CM

## 2025-07-31 DIAGNOSIS — E78.2 MIXED HYPERLIPIDEMIA: ICD-10-CM

## 2025-07-31 LAB
25(OH)D3 SERPL-MCNC: 23 NG/ML (ref 30–100)
ALBUMIN SERPL-MCNC: 4.1 G/DL (ref 3.5–5.2)
ALBUMIN/GLOB SERPL: 2.1 {RATIO} (ref 1–2.5)
ALP SERPL-CCNC: 54 U/L (ref 40–129)
ALT SERPL-CCNC: 37 U/L (ref 10–50)
ANION GAP SERPL CALCULATED.3IONS-SCNC: 11 MMOL/L (ref 9–16)
AST SERPL-CCNC: 21 U/L (ref 10–50)
BILIRUB SERPL-MCNC: 0.5 MG/DL (ref 0–1.2)
BUN SERPL-MCNC: 17 MG/DL (ref 6–20)
CALCIUM SERPL-MCNC: 9.1 MG/DL (ref 8.6–10.4)
CHLORIDE SERPL-SCNC: 102 MMOL/L (ref 98–107)
CHOLEST SERPL-MCNC: 156 MG/DL (ref 0–199)
CHOLESTEROL/HDL RATIO: 4.6
CO2 SERPL-SCNC: 25 MMOL/L (ref 20–31)
CREAT SERPL-MCNC: 1 MG/DL (ref 0.7–1.2)
GFR, ESTIMATED: >90 ML/MIN/1.73M2
GLUCOSE SERPL-MCNC: 96 MG/DL (ref 74–99)
HDLC SERPL-MCNC: 34 MG/DL
LDLC SERPL CALC-MCNC: 70 MG/DL (ref 0–100)
POTASSIUM SERPL-SCNC: 3.7 MMOL/L (ref 3.7–5.3)
PROT SERPL-MCNC: 6.1 G/DL (ref 6.6–8.7)
SODIUM SERPL-SCNC: 138 MMOL/L (ref 136–145)
TRIGL SERPL-MCNC: 260 MG/DL
VLDLC SERPL CALC-MCNC: 52 MG/DL (ref 1–30)

## 2025-07-31 PROCEDURE — 80053 COMPREHEN METABOLIC PANEL: CPT

## 2025-07-31 PROCEDURE — 36415 COLL VENOUS BLD VENIPUNCTURE: CPT

## 2025-07-31 PROCEDURE — 80061 LIPID PANEL: CPT

## 2025-07-31 PROCEDURE — 82306 VITAMIN D 25 HYDROXY: CPT

## (undated) DEVICE — TOTAL TRAY, 16FR 10ML SIL FOLEY, URN: Brand: MEDLINE

## (undated) DEVICE — STAPLER 60: Brand: SUREFORM

## (undated) DEVICE — DEVICE TRCR 12X9X3IN WHT CLSR DISP OMNICLOSE

## (undated) DEVICE — SUCTION IRRIGATOR: Brand: ENDOWRIST

## (undated) DEVICE — ELECTRODE PT RET AD L9FT HI MOIST COND ADH HYDRGEL CORDED

## (undated) DEVICE — INSUFFLATION NEEDLE TO ESTABLISH PNEUMOPERITONEUM.: Brand: INSUFFLATION NEEDLE

## (undated) DEVICE — SYRINGE MED 10ML LUERLOCK TIP W/O SFTY DISP

## (undated) DEVICE — SUTURE SZ 0 27IN 5/8 CIR UR-6  TAPER PT VIOLET ABSRB VICRYL J603H

## (undated) DEVICE — VESSEL SEALER EXTEND: Brand: ENDOWRIST

## (undated) DEVICE — 450 ML BOTTLE OF 0.05% CHLORHEXIDINE GLUCONATE IN 99.95% STERILE WATER FOR IRRIGATION, USP AND APPLICATOR.: Brand: IRRISEPT ANTIMICROBIAL WOUND LAVAGE

## (undated) DEVICE — GLOVE ORANGE PI 7   MSG9070

## (undated) DEVICE — TIP COVER ACCESSORY

## (undated) DEVICE — BLADELESS OBTURATOR: Brand: WECK VISTA

## (undated) DEVICE — SUTURE VCRL + SZ 3-0 L27IN ABSRB UD L26MM SH 1/2 CIR VCP416H

## (undated) DEVICE — SOLUTION IV IRRIG POUR BRL 0.9% SODIUM CHL 2F7124

## (undated) DEVICE — SOLUTION IV 1000ML 0.9% SOD CHL PH 5 INJ USP VIAFLX PLAS

## (undated) DEVICE — ARM DRAPE

## (undated) DEVICE — LEGGINGS, PAIR, 31X48, STERILE: Brand: MEDLINE

## (undated) DEVICE — APPLICATOR MEDICATED 26 CC SOLUTION HI LT ORNG CHLORAPREP

## (undated) DEVICE — SUTURE MCRYL + SZ 4-0 L27IN ABSRB UD L19MM PS-2 3/8 CIR MCP426H

## (undated) DEVICE — REDUCER: Brand: ENDOWRIST

## (undated) DEVICE — CANNULA SEAL

## (undated) DEVICE — BLADE CLIPPER GEN PURP NS

## (undated) DEVICE — TOWEL,OR,DSP,ST,NATURAL,DLX,4/PK,20PK/CS: Brand: MEDLINE

## (undated) DEVICE — BLANKET WRM W29.9XL79.1IN UP BODY FORC AIR MISTRAL-AIR

## (undated) DEVICE — ANCHOR TISSUE RETRIEVAL SYSTEM, BAG SIZE 125 ML, PORT SIZE 8 MM: Brand: ANCHOR TISSUE RETRIEVAL SYSTEM

## (undated) DEVICE — STAPLER 60 RELOAD WHITE: Brand: SUREFORM

## (undated) DEVICE — GOWN,AURORA,NONRNF,XL,30/CS: Brand: MEDLINE

## (undated) DEVICE — SOLUTION ANTIFOG VIS SYS CLEARIFY LAPSCP

## (undated) DEVICE — MHPB BASIC LAP PACK: Brand: MEDLINE INDUSTRIES, INC.

## (undated) DEVICE — PAD PT POS 36 IN SURGYPAD DISP

## (undated) DEVICE — SEAL

## (undated) DEVICE — STRAP,POSITIONING,KNEE/BODY,FOAM,4X60": Brand: MEDLINE

## (undated) DEVICE — APPLIER CLP M L L11.4IN DIA10MM ENDOSCP ROT MULT FOR LIG